# Patient Record
Sex: FEMALE | Race: WHITE | Employment: OTHER | URBAN - METROPOLITAN AREA
[De-identification: names, ages, dates, MRNs, and addresses within clinical notes are randomized per-mention and may not be internally consistent; named-entity substitution may affect disease eponyms.]

---

## 2017-05-31 ENCOUNTER — HOSPITAL ENCOUNTER (OUTPATIENT)
Dept: MAMMOGRAPHY | Age: 62
Discharge: HOME | End: 2017-05-31
Attending: FAMILY MEDICINE
Payer: COMMERCIAL

## 2017-05-31 DIAGNOSIS — Z12.31 VISIT FOR SCREENING MAMMOGRAM: ICD-10-CM

## 2017-05-31 PROCEDURE — 77063 BREAST TOMOSYNTHESIS BI: CPT

## 2018-06-20 ENCOUNTER — HOSPITAL ENCOUNTER (OUTPATIENT)
Dept: MAMMOGRAPHY | Age: 63
Discharge: HOME | End: 2018-06-20
Attending: FAMILY MEDICINE
Payer: COMMERCIAL

## 2018-06-20 DIAGNOSIS — Z12.39 SCREENING BREAST EXAMINATION: ICD-10-CM

## 2018-06-20 PROCEDURE — 77063 BREAST TOMOSYNTHESIS BI: CPT

## 2019-07-10 ENCOUNTER — HOSPITAL ENCOUNTER (OUTPATIENT)
Dept: MAMMOGRAPHY | Age: 64
Discharge: HOME | End: 2019-07-10
Attending: FAMILY MEDICINE
Payer: COMMERCIAL

## 2019-07-10 DIAGNOSIS — Z12.39 BREAST SCREENING, UNSPECIFIED: ICD-10-CM

## 2019-07-10 PROCEDURE — 77063 BREAST TOMOSYNTHESIS BI: CPT

## 2019-07-10 NOTE — PROGRESS NOTES
I got the result of her normal mammogram report. But advise pt she is long overdue a checkup. Patient not seen here since 2016 by Vish Ma, but I havent seen her since 2012. So she needs to get something scheduled soon.

## 2019-07-31 ENCOUNTER — OFFICE VISIT (OUTPATIENT)
Dept: FAMILY MEDICINE CLINIC | Age: 64
End: 2019-07-31

## 2019-07-31 VITALS
HEIGHT: 59 IN | SYSTOLIC BLOOD PRESSURE: 120 MMHG | DIASTOLIC BLOOD PRESSURE: 72 MMHG | WEIGHT: 145.6 LBS | RESPIRATION RATE: 18 BRPM | HEART RATE: 88 BPM | TEMPERATURE: 99 F | BODY MASS INDEX: 29.35 KG/M2 | OXYGEN SATURATION: 98 %

## 2019-07-31 DIAGNOSIS — Z00.00 ROUTINE GENERAL MEDICAL EXAMINATION AT A HEALTH CARE FACILITY: Primary | ICD-10-CM

## 2019-07-31 PROBLEM — Z12.83 SCREENING EXAM FOR SKIN CANCER: Status: ACTIVE | Noted: 2019-07-31

## 2019-07-31 PROBLEM — R13.10 DYSPHAGIA: Status: ACTIVE | Noted: 2019-07-31

## 2019-07-31 NOTE — PROGRESS NOTES
Jim Reza is a 59 y.o. female    Chief Complaint   Patient presents with    Complete Physical     1. Have you been to the ER, urgent care clinic since your last visit? Hospitalized since your last visit? No    2. Have you seen or consulted any other health care providers outside of the [address] [place] [address] since your last visit? Include any pap smears or colon screening.  No     Visit Vitals  /72 (BP 1 Location: Right arm, BP Patient Position: Sitting)   Pulse (!) 101   Temp 99 °F (37.2 °C) (Oral)   Resp 18   Ht 4' 11\" (1.499 m)   Wt 145 lb 9.6 oz (66 kg)   SpO2 98%   BMI 29.41 kg/m²

## 2019-07-31 NOTE — PROGRESS NOTES
Chief Complaint   Patient presents with    Complete Physical     not fasting today, will RTC later this week for labs     HISTORY OF PRESENT ILLNESS   HPI  Annual CPE. Not fasting today. Will get labs done later this week. Last CPE 2016. Has been getting along really well in the interim. Feels well. Sensible diet. Not exercising but stays active. Wt stable, unchanged from 2016. REVIEW OF SYMPTOMS   Review of Systems   Constitutional: Negative. HENT: Negative. Eyes: Negative. Respiratory: Negative. Cardiovascular: Negative. Gastrointestinal: Negative. Genitourinary: Negative. Musculoskeletal: Negative. Skin:        Saw Derm for skin check and cancer screening last year   Neurological: Negative. Psychiatric/Behavioral: Negative.             PROBLEM LIST/MEDICAL HISTORY     Problem List  Date Reviewed: 2019          Codes Class Noted    Dysphagia ICD-10-CM: R13.10  ICD-9-CM: 787.20  2019    Overview Signed 2019  1:44 PM by Lucie Ahuja MD     Eval  ENT Dr. Joyce Davis EGD Esophageal Dilatation              Retained suture ICD-10-CM: T81.89XA, Z18.9  ICD-9-CM: 998.89, V90.9  2012        Granulation tissue at vaginal vault ICD-10-CM: N89.8  ICD-9-CM: 623.8  2012        Seasonal allergic rhinitis, mild ICD-10-CM: J30.2  ICD-9-CM: 477.9  2010    Overview Signed 2010 11:34 AM by Lucie Ahuja MD     Spring/; OTC's have done fine prn over the yrs              (normal spontaneous vaginal delivery) x2 ICD-10-CM: O80  ICD-9-CM: 650  2010    Overview Signed 2010  9:04 PM by Lucie Ahuja MD     ; Previous Gyn Dr Theron Buerger             H/O Uterine Prolapse & Fibroids s/p Partial hysterectomy ICD-10-CM: N81.4  ICD-9-CM: 618.1  2010    Overview Signed 2010  9:05 PM by Lucie Ahuja MD                  History of eczema ICD-10-CM: Z87.2  ICD-9-CM: V13.3  2010 PAST SURGICAL HISTORY     Past Surgical History:   Procedure Laterality Date    APPENDECTOMY  1991    COLONOSCOPY  2009    ok x 10yrs    HX COLONOSCOPY  05/20/2017    Repeat 10 yrs, Dr. Jaimee Torrez HX ENDOSCOPY  05/20/2017    chronic gastritis and H Pylori positive-Dr Frankie Correia    HX PARTIAL HYSTERECTOMY  2002    Uterine Prolapse & Fibroids    HX TUBAL LIGATION  1986    HX WISDOM TEETH EXTRACTION           MEDICATIONS     Current Outpatient Medications   Medication Sig    BIOTIN PO Take  by mouth daily.  fluticasone (FLONASE) 50 mcg/actuation nasal spray 2 Sprays by Both Nostrils route daily.  MULTIVITAMINS (MULTIVITAMIN PO) Take  by mouth daily. No current facility-administered medications for this visit.            ALLERGIES   No Known Allergies       SOCIAL HISTORY     Social History     Socioeconomic History    Marital status:      Spouse name: Not on file    Number of children: 2    Years of education: Not on file    Highest education level: Not on file   Occupational History    Occupation: Dental Asst   Tobacco Use    Smoking status: Never Smoker    Smokeless tobacco: Never Used   Substance and Sexual Activity    Alcohol use: No    Drug use: No    Sexual activity: Yes     Partners: Male     Comment: Hysterectomy   Other Topics Concern    Caffeine Concern No     Comment: 1 cup of coffee a day    Weight Concern No     Comment: stable, ~ 130's    Special Diet Yes     Comment: just tries to eat sensibly, low fat, lots of veggies, high fiber    Exercise No     Comment: just stays very active        IMMUNIZATIONS  Immunization History   Administered Date(s) Administered    Influenza Vaccine (Quad) Mdck Pf 10/31/2018    Tdap 01/01/2016         FAMILY HISTORY     Family History   Problem Relation Age of Onset    High Cholesterol Mother     Arthritis-osteo Mother     Osteoporosis Mother     Stroke Mother         TIA    Dementia Mother 80        lives in [place]  Diabetes Brother 36        Type 2    Other Sister         fibroids, endometriosis    Deep Vein Thrombosis Sister         post-op DVT    Atrial Fibrillation Father 80    Attention Deficit Disorder Daughter     Parkinsonism Paternal Grandfather          in his [de-identified] Other Paternal Grandmother          at 80    No Known Problems Son          VITALS     Visit Vitals  /72 (BP 1 Location: Right arm, BP Patient Position: Sitting)   Pulse 88   Temp 99 °F (37.2 °C) (Oral)   Resp 18   Ht 4' 11\" (1.499 m)   Wt 145 lb 9.6 oz (66 kg)   SpO2 98%   BMI 29.41 kg/m²          PHYSICAL EXAMINATION   Physical Exam   Constitutional: She is oriented to person, place, and time and well-developed, well-nourished, and in no distress. HENT:   Right Ear: Tympanic membrane normal.   Left Ear: Tympanic membrane normal.   Nose: Nose normal.   Mouth/Throat: Oropharynx is clear and moist. No oropharyngeal exudate. Eyes: Pupils are equal, round, and reactive to light. Conjunctivae and EOM are normal.   Neck: Neck supple. No JVD present. Carotid bruit is not present. No thyromegaly present. Cardiovascular: Normal rate, regular rhythm and normal heart sounds. No murmur heard. Pulmonary/Chest: Effort normal and breath sounds normal. No respiratory distress. Abdominal: Soft. Bowel sounds are normal. She exhibits no distension and no mass. There is no tenderness. Musculoskeletal: Normal range of motion. She exhibits no edema or tenderness. Lymphadenopathy:     She has no cervical adenopathy. Neurological: She is alert and oriented to person, place, and time. Gait normal. Coordination normal.   Psychiatric: Mood and affect normal.   Vitals reviewed.           DIAGNOSTIC DATA         LABORATORY DATA     Results for orders placed or performed in visit on 16   HEPATITIS C AB   Result Value Ref Range    Hep C Virus Ab 0.1 0.0 - 0.9 s/co ratio   CBC W/O DIFF   Result Value Ref Range    WBC 5.5 3.4 - 10.8 x10E3/uL    RBC 4.37 3.77 - 5.28 x10E6/uL    HGB 12.9 11.1 - 15.9 g/dL    HCT 37.2 34.0 - 46.6 %    MCV 85 79 - 97 fL    MCH 29.5 26.6 - 33.0 pg    MCHC 34.7 31.5 - 35.7 g/dL    RDW 13.1 12.3 - 15.4 %    PLATELET 218 092 - 131 x10E3/uL   LIPID PANEL   Result Value Ref Range    Cholesterol, total 140 100 - 199 mg/dL    Triglyceride 83 0 - 149 mg/dL    HDL Cholesterol 47 >39 mg/dL    VLDL, calculated 17 5 - 40 mg/dL    LDL, calculated 76 0 - 99 mg/dL   METABOLIC PANEL, COMPREHENSIVE   Result Value Ref Range    Glucose 99 65 - 99 mg/dL    BUN 15 8 - 27 mg/dL    Creatinine 0.90 0.57 - 1.00 mg/dL    GFR est non-AA 70 >59 mL/min/1.73    GFR est AA 80 >59 mL/min/1.73    BUN/Creatinine ratio 17 11 - 26    Sodium 142 134 - 144 mmol/L    Potassium 4.7 3.5 - 5.2 mmol/L    Chloride 103 96 - 106 mmol/L    CO2 25 18 - 29 mmol/L    Calcium 9.7 8.7 - 10.3 mg/dL    Protein, total 6.6 6.0 - 8.5 g/dL    Albumin 4.4 3.6 - 4.8 g/dL    GLOBULIN, TOTAL 2.2 1.5 - 4.5 g/dL    A-G Ratio 2.0 1.1 - 2.5    Bilirubin, total 0.5 0.0 - 1.2 mg/dL    Alk. phosphatase 51 39 - 117 IU/L    AST (SGOT) 15 0 - 40 IU/L    ALT (SGPT) 18 0 - 32 IU/L   TSH 3RD GENERATION   Result Value Ref Range    TSH 0.957 0.450 - 4.500 uIU/mL   VITAMIN D, 25 HYDROXY   Result Value Ref Range    VITAMIN D, 25-HYDROXY 39.4 30.0 - 100.0 ng/mL   CVD REPORT   Result Value Ref Range    INTERPRETATION Note           ASSESSMENT & PLAN       ICD-10-CM ICD-9-CM    1. Routine general medical examination at a health care facility Z00.00 V70.0 CBC W/O DIFF      LIPID PANEL      METABOLIC PANEL, COMPREHENSIVE      TSH 3RD GENERATION      VITAMIN D, 25 HYDROXY            She will RTC fasting later in the week for the above labs  Cardiovascular risk and specific lipid/LDL goals reviewed  Reviewed diet, nutrition, exercise, weight management, BMI/goals, age/risk based screening recommendations, health maintenance & prevention counseling.  Cancer screening USPTFS guidelines reviewed w/ pt today. Discussed benefits/positive/negative outcomes of screening based on age/risk stratification. Informed consent for/against screening based on pt's personal hx/risk factors. Updated in history above and health maintenance. Further follow up & other recommendations pending review of labs.  If all remains good and stable, follow up in 1 yr, sooner prn

## 2019-07-31 NOTE — PATIENT INSTRUCTIONS
Well Visit, Women 48 to 72: Care Instructions  Your Care Instructions    Physical exams can help you stay healthy. Your doctor has checked your overall health and may have suggested ways to take good care of yourself. He or she also may have recommended tests. At home, you can help prevent illness with healthy eating, regular exercise, and other steps. Follow-up care is a key part of your treatment and safety. Be sure to make and go to all appointments, and call your doctor if you are having problems. It's also a good idea to know your test results and keep a list of the medicines you take. How can you care for yourself at home? · Reach and stay at a healthy weight. This will lower your risk for many problems, such as obesity, diabetes, heart disease, and high blood pressure. · Get at least 30 minutes of exercise on most days of the week. Walking is a good choice. You also may want to do other activities, such as running, swimming, cycling, or playing tennis or team sports. · Do not smoke. Smoking can make health problems worse. If you need help quitting, talk to your doctor about stop-smoking programs and medicines. These can increase your chances of quitting for good. · Protect your skin from too much sun. When you're outdoors from 10 a.m. to 4 p.m., stay in the shade or cover up with clothing and a hat with a wide brim. Wear sunglasses that block UV rays. Even when it's cloudy, put broad-spectrum sunscreen (SPF 30 or higher) on any exposed skin. · See a dentist one or two times a year for checkups and to have your teeth cleaned. · Wear a seat belt in the car. Follow your doctor's advice about when to have certain tests. These tests can spot problems early. · Cholesterol. Your doctor will tell you how often to have this done based on your age, family history, or other things that can increase your risk for heart attack and stroke. · Blood pressure.  Have your blood pressure checked during a routine doctor visit. Your doctor will tell you how often to check your blood pressure based on your age, your blood pressure results, and other factors. · Mammogram. Ask your doctor how often you should have a mammogram, which is an X-ray of your breasts. A mammogram can spot breast cancer before it can be felt and when it is easiest to treat. · Pap test and pelvic exam. Ask your doctor how often you should have a Pap test. You may not need to have a Pap test as often as you used to. · Vision. Have your eyes checked every year or two or as often as your doctor suggests. Some experts recommend that you have yearly exams for glaucoma and other age-related eye problems starting at age 48. · Hearing. Tell your doctor if you notice any change in your hearing. You can have tests to find out how well you hear. · Diabetes. Ask your doctor whether you should have tests for diabetes. · Colorectal cancer. Your risk for colorectal cancer gets higher as you get older. Some experts say that adults should start regular screening at age 48 and stop at age 76. Others say to start before age 48 or continue after age 76. Talk with your doctor about your risk and when to start and stop screening. · Thyroid disease. Talk to your doctor about whether to have your thyroid checked as part of a regular physical exam. Women have an increased chance of a thyroid problem. · Osteoporosis. You should begin tests for bone density at age 72. If you are younger than 72, ask your doctor whether you have factors that may increase your risk for this disease. You may want to have this test before age 72. · Heart attack and stroke risk. At least every 4 to 6 years, you should have your risk for heart attack and stroke assessed. Your doctor uses factors such as your age, blood pressure, cholesterol, and whether you smoke or have diabetes to show what your risk for a heart attack or stroke is over the next 10 years.   When should you call for help?  Watch closely for changes in your health, and be sure to contact your doctor if you have any problems or symptoms that concern you. Where can you learn more? Go to http://compa-sam.info/. Enter E888 in the search box to learn more about \"Well Visit, Women 50 to 72: Care Instructions. \"  Current as of: December 13, 2018  Content Version: 12.1  © 3081-5692 [employer], [employer]. Care instructions adapted under license by [employer] (which disclaims liability or warranty for this information). If you have questions about a medical condition or this instruction, always ask your healthcare professional. [address] any warranty or liability for your use of this information.

## 2019-08-03 LAB
25(OH)D3+25(OH)D2 SERPL-MCNC: 38.3 NG/ML (ref 30–100)
ALBUMIN SERPL-MCNC: 4.2 G/DL (ref 3.6–4.8)
ALBUMIN/GLOB SERPL: 1.6 {RATIO} (ref 1.2–2.2)
ALP SERPL-CCNC: 52 IU/L (ref 39–117)
ALT SERPL-CCNC: 18 IU/L (ref 0–32)
AST SERPL-CCNC: 16 IU/L (ref 0–40)
BILIRUB SERPL-MCNC: 1 MG/DL (ref 0–1.2)
BUN SERPL-MCNC: 10 MG/DL (ref 8–27)
BUN/CREAT SERPL: 10 (ref 12–28)
CALCIUM SERPL-MCNC: 9.5 MG/DL (ref 8.7–10.3)
CHLORIDE SERPL-SCNC: 102 MMOL/L (ref 96–106)
CHOLEST SERPL-MCNC: 138 MG/DL (ref 100–199)
CO2 SERPL-SCNC: 22 MMOL/L (ref 20–29)
CREAT SERPL-MCNC: 1.04 MG/DL (ref 0.57–1)
ERYTHROCYTE [DISTWIDTH] IN BLOOD BY AUTOMATED COUNT: 12.4 % (ref 12.3–15.4)
GLOBULIN SER CALC-MCNC: 2.6 G/DL (ref 1.5–4.5)
GLUCOSE SERPL-MCNC: 131 MG/DL (ref 65–99)
HCT VFR BLD AUTO: 38.6 % (ref 34–46.6)
HDLC SERPL-MCNC: 42 MG/DL
HGB BLD-MCNC: 12.7 G/DL (ref 11.1–15.9)
INTERPRETATION, 910389: NORMAL
INTERPRETATION: NORMAL
LDLC SERPL CALC-MCNC: 74 MG/DL (ref 0–99)
MCH RBC QN AUTO: 29.5 PG (ref 26.6–33)
MCHC RBC AUTO-ENTMCNC: 32.9 G/DL (ref 31.5–35.7)
MCV RBC AUTO: 90 FL (ref 79–97)
PDF IMAGE, 910387: NORMAL
PLATELET # BLD AUTO: 287 X10E3/UL (ref 150–450)
POTASSIUM SERPL-SCNC: 4.4 MMOL/L (ref 3.5–5.2)
PROT SERPL-MCNC: 6.8 G/DL (ref 6–8.5)
RBC # BLD AUTO: 4.3 X10E6/UL (ref 3.77–5.28)
SODIUM SERPL-SCNC: 141 MMOL/L (ref 134–144)
TRIGL SERPL-MCNC: 109 MG/DL (ref 0–149)
TSH SERPL DL<=0.005 MIU/L-ACNC: 1.03 UIU/ML (ref 0.45–4.5)
VLDLC SERPL CALC-MCNC: 22 MG/DL (ref 5–40)
WBC # BLD AUTO: 8.2 X10E3/UL (ref 3.4–10.8)

## 2019-08-18 ENCOUNTER — TELEPHONE (OUTPATIENT)
Dept: FAMILY MEDICINE CLINIC | Age: 64
End: 2019-08-18

## 2019-08-18 DIAGNOSIS — N28.9 MILD RENAL INSUFFICIENCY: ICD-10-CM

## 2019-08-18 DIAGNOSIS — R73.01 IMPAIRED FASTING GLUCOSE: Primary | ICD-10-CM

## 2019-08-18 NOTE — TELEPHONE ENCOUNTER
Blood counts, liver, electrolytes, thyroid and vitamin d all normal.    Cholesterol numbers overall good and at goal except HDL goal for women >50, hers is 42. BS is elevated at 131, normal <100, between 100-126 prediabetes, over 126 diabetes but we dont base that on just one test that is mildly elevated like this. Will recheck labs, see details below. Follow sugar free, low carb diet and try to get at least 150 minutes moderate cardio exercise per week. Kidney fnc borderline. Increase water intake and avoid nsaids. I would like her to come back for lab only, no appt needed and do NOT fast. Orders pended. I will be rechecking her cr and adding a HgBA1c 3 month sugar average to see what her range has been. Drink lots of water prior to lab testing that day.   Further recs after labs

## 2019-08-21 NOTE — TELEPHONE ENCOUNTER
Outgoing call to patient,  verified. Reviewed lab results and recommendations, patient expressed understanding, states she will return for labs.

## 2019-08-21 NOTE — TELEPHONE ENCOUNTER
Patient is returning a call to the office and requested to speak to dr. Nataliia Butcher nurse.      Best contact# [phone]

## 2019-09-09 ENCOUNTER — TELEPHONE (OUTPATIENT)
Dept: FAMILY MEDICINE CLINIC | Age: 64
End: 2019-09-09

## 2019-09-09 NOTE — TELEPHONE ENCOUNTER
LVM that she had Hep C done back in Dec 2016 and it was neg.   She can call me back if any questions

## 2019-09-09 NOTE — TELEPHONE ENCOUNTER
Pt is calling to see if she has a Hep C done earlier and what was the results  [facility]# [phone]    Last hep C : 12/16/2016

## 2019-09-13 DIAGNOSIS — N28.9 MILD RENAL INSUFFICIENCY: ICD-10-CM

## 2019-09-13 DIAGNOSIS — R73.01 IMPAIRED FASTING GLUCOSE: ICD-10-CM

## 2019-09-14 LAB
BUN SERPL-MCNC: 11 MG/DL (ref 8–27)
BUN/CREAT SERPL: 11 (ref 12–28)
CALCIUM SERPL-MCNC: 9.7 MG/DL (ref 8.7–10.3)
CHLORIDE SERPL-SCNC: 103 MMOL/L (ref 96–106)
CO2 SERPL-SCNC: 24 MMOL/L (ref 20–29)
CREAT SERPL-MCNC: 0.99 MG/DL (ref 0.57–1)
EST. AVERAGE GLUCOSE BLD GHB EST-MCNC: 137 MG/DL
GLUCOSE SERPL-MCNC: 104 MG/DL (ref 65–99)
HBA1C MFR BLD: 6.4 % (ref 4.8–5.6)
POTASSIUM SERPL-SCNC: 5.1 MMOL/L (ref 3.5–5.2)
SODIUM SERPL-SCNC: 143 MMOL/L (ref 134–144)

## 2019-09-15 ENCOUNTER — TELEPHONE (OUTPATIENT)
Dept: FAMILY MEDICINE CLINIC | Age: 64
End: 2019-09-15

## 2019-09-15 PROBLEM — R73.03 PREDIABETES: Status: ACTIVE | Noted: 2019-09-15

## 2019-09-15 NOTE — TELEPHONE ENCOUNTER
Advise pt her follow up creatinine has improved and is back to normal.  Continue staying adequately hydrated and avoiding nsaids. Her Hgba1c is elevated in the prediabetes range. I did a separate letter just to include some recommendations/tips for us to mail to patient, let her know. Schedule non fasting follow up appt for 6 months to reassess this.

## 2019-09-16 NOTE — TELEPHONE ENCOUNTER
Outgoing call to patient,  verified. Reviewed results patient expressed understanding. Address verified. Patient states she will call back to schedule follow up when she knows what days she has off for work.

## 2020-08-26 ENCOUNTER — HOSPITAL ENCOUNTER (OUTPATIENT)
Dept: MAMMOGRAPHY | Age: 65
Discharge: HOME | End: 2020-08-26
Attending: FAMILY MEDICINE
Payer: COMMERCIAL

## 2020-08-26 DIAGNOSIS — Z12.31 VISIT FOR SCREENING MAMMOGRAM: ICD-10-CM

## 2020-08-26 PROCEDURE — 77063 BREAST TOMOSYNTHESIS BI: CPT

## 2021-08-02 ENCOUNTER — TRANSCRIBE ORDER (OUTPATIENT)
Dept: SCHEDULING | Age: 66
End: 2021-08-02

## 2021-08-02 DIAGNOSIS — Z12.31 VISIT FOR SCREENING MAMMOGRAM: Primary | ICD-10-CM

## 2021-09-01 ENCOUNTER — HOSPITAL ENCOUNTER (OUTPATIENT)
Dept: MAMMOGRAPHY | Age: 66
Discharge: HOME | End: 2021-09-01
Attending: FAMILY MEDICINE
Payer: COMMERCIAL

## 2021-09-01 DIAGNOSIS — Z12.31 VISIT FOR SCREENING MAMMOGRAM: ICD-10-CM

## 2021-09-01 PROCEDURE — 77063 BREAST TOMOSYNTHESIS BI: CPT

## 2022-03-19 PROBLEM — R13.10 DYSPHAGIA: Status: ACTIVE | Noted: 2019-07-31

## 2022-03-19 PROBLEM — R73.01 IMPAIRED FASTING GLUCOSE: Status: ACTIVE | Noted: 2019-08-18

## 2022-03-19 PROBLEM — Z12.83 SCREENING EXAM FOR SKIN CANCER: Status: ACTIVE | Noted: 2019-07-31

## 2022-03-19 PROBLEM — R73.03 PREDIABETES: Status: ACTIVE | Noted: 2019-09-15

## 2022-08-16 ENCOUNTER — TRANSCRIBE ORDER (OUTPATIENT)
Dept: SCHEDULING | Age: 67
End: 2022-08-16

## 2022-08-16 DIAGNOSIS — Z12.31 SCREENING MAMMOGRAM FOR HIGH-RISK PATIENT: Primary | ICD-10-CM

## 2022-09-22 ENCOUNTER — HOSPITAL ENCOUNTER (OUTPATIENT)
Dept: MAMMOGRAPHY | Age: 67
Discharge: HOME | End: 2022-09-22
Attending: FAMILY MEDICINE
Payer: MEDICARE

## 2022-09-22 DIAGNOSIS — Z12.31 SCREENING MAMMOGRAM FOR HIGH-RISK PATIENT: ICD-10-CM

## 2022-09-22 PROCEDURE — 77063 BREAST TOMOSYNTHESIS BI: CPT

## 2022-11-15 ENCOUNTER — OFFICE VISIT (OUTPATIENT)
Dept: FAMILY MEDICINE CLINIC | Age: 67
End: 2022-11-15
Payer: MEDICARE

## 2022-11-15 VITALS
HEART RATE: 82 BPM | RESPIRATION RATE: 16 BRPM | SYSTOLIC BLOOD PRESSURE: 136 MMHG | HEIGHT: 59 IN | TEMPERATURE: 98.8 F | DIASTOLIC BLOOD PRESSURE: 70 MMHG | WEIGHT: 143.2 LBS | BODY MASS INDEX: 28.87 KG/M2 | OXYGEN SATURATION: 100 %

## 2022-11-15 DIAGNOSIS — Z13.820 SCREENING FOR OSTEOPOROSIS: ICD-10-CM

## 2022-11-15 DIAGNOSIS — Z13.6 SCREENING FOR ISCHEMIC HEART DISEASE: ICD-10-CM

## 2022-11-15 DIAGNOSIS — Z00.00 INITIAL MEDICARE ANNUAL WELLNESS VISIT: Primary | ICD-10-CM

## 2022-11-15 DIAGNOSIS — R73.03 PREDIABETES: ICD-10-CM

## 2022-11-15 DIAGNOSIS — Z78.0 POSTMENOPAUSAL STATE: ICD-10-CM

## 2022-11-15 PROCEDURE — G0438 PPPS, INITIAL VISIT: HCPCS | Performed by: FAMILY MEDICINE

## 2022-11-15 PROCEDURE — 1123F ACP DISCUSS/DSCN MKR DOCD: CPT | Performed by: FAMILY MEDICINE

## 2022-11-15 PROCEDURE — G8400 PT W/DXA NO RESULTS DOC: HCPCS | Performed by: FAMILY MEDICINE

## 2022-11-15 PROCEDURE — G8427 DOCREV CUR MEDS BY ELIG CLIN: HCPCS | Performed by: FAMILY MEDICINE

## 2022-11-15 PROCEDURE — G8417 CALC BMI ABV UP PARAM F/U: HCPCS | Performed by: FAMILY MEDICINE

## 2022-11-15 PROCEDURE — G8510 SCR DEP NEG, NO PLAN REQD: HCPCS | Performed by: FAMILY MEDICINE

## 2022-11-15 PROCEDURE — 1101F PT FALLS ASSESS-DOCD LE1/YR: CPT | Performed by: FAMILY MEDICINE

## 2022-11-15 PROCEDURE — 3017F COLORECTAL CA SCREEN DOC REV: CPT | Performed by: FAMILY MEDICINE

## 2022-11-15 PROCEDURE — G8536 NO DOC ELDER MAL SCRN: HCPCS | Performed by: FAMILY MEDICINE

## 2022-11-15 PROCEDURE — G9899 SCRN MAM PERF RSLTS DOC: HCPCS | Performed by: FAMILY MEDICINE

## 2022-11-15 NOTE — PROGRESS NOTES
Chief Complaint   Patient presents with    Annual Wellness Visit       HISTORY OF PRESENT ILLNESS   HPI  Follow up prediabetes and mild hypercholesterolemia. Not fasting today. Diet: nothing special, just follows sensible, balanced diet, lots of fruits/veggies, grilling/baking, mostly chicken/fish/seafood, occ red meat  Exercise: walks 1 mile every day w/ , they also stay very active in their yard and haul a lot of firewood  Caffeine: 1 cup of coffee qam, no tea or sodas  Weight: stable in the 140's over the years   REVIEW OF SYMPTOMS   Review of Systems   Constitutional: Negative. HENT: Negative. Eyes: Negative. Respiratory: Negative. Cardiovascular: Negative. Gastrointestinal: Negative. Genitourinary: Negative. Musculoskeletal: Negative. Neurological: Negative. Endo/Heme/Allergies: Negative. Psychiatric/Behavioral: Negative.            PROBLEM LIST/MEDICAL HISTORY     Problem List  Date Reviewed: 11/15/2022            Codes Class Noted    Prediabetes ICD-10-CM: R73.03  ICD-9-CM: 790.29  9/15/2019        Impaired fasting glucose ICD-10-CM: R73.01  ICD-9-CM: 790.21  8/18/2019    Overview Signed 8/18/2019  3:53 PM by Biju Solares MD     [id number]             Dysphagia ICD-10-CM: R13.10  ICD-9-CM: 787.20  7/31/2019    Overview Signed 7/31/2019  1:44 PM by Biju Solares MD     Eval 2017 ENT Dr. Carol Castro EGD Esophageal Dilatation              Screening exam for skin cancer ICD-10-CM: Z12.83  ICD-9-CM: V76.43  7/31/2019    Overview Signed 7/31/2019  2:01 PM by Biju Solares MD     2018 Derm Dr. Valentino Serum             Retained suture ICD-10-CM: T81.89XA, Z18.9  ICD-9-CM: 998.89, V90.9  11/30/2012        Granulation tissue at vaginal vault ICD-10-CM: N89.8  ICD-9-CM: 623.8  11/30/2012        Seasonal allergic rhinitis, mild ICD-10-CM: J30.2  ICD-9-CM: 477.9  9/22/2010    Overview Signed 9/22/2010 11:34 AM by Biju Solares MD Spring/fall; OTC's have done fine prn over the yrs              (normal spontaneous vaginal delivery) x2 ICD-10-CM: O80  ICD-9-CM: 650  2010    Overview Signed 2010  9:04 PM by Sudhir Salinas MD     ; Previous Gyn Dr Miguel Song             H/O Uterine Prolapse & Fibroids s/p Partial hysterectomy ICD-10-CM: N81.4  ICD-9-CM: 618.1  2010    Overview Signed 2010  9:05 PM by Sudhir Salinas MD     2002             History of eczema ICD-10-CM: Z87.2  ICD-9-CM: V13.3  2010               PAST SURGICAL HISTORY     Past Surgical History:   Procedure Laterality Date    HX COLONOSCOPY      Ok x 10yrs    HX COLONOSCOPY  2017    Repeat 10 yrs, Dr. Nkechi Conley ENDOSCOPY  2017    Chronic gastritis and H Pylori positive-Dr Nkechi Conley PARTIAL HYSTERECTOMY      Uterine Prolapse & Fibroids    HX TUBAL LIGATION      HX WISDOM TEETH EXTRACTION      UT APPENDECTOMY           MEDICATIONS     Current Outpatient Medications   Medication Sig    fluticasone (FLONASE) 50 mcg/actuation nasal spray 2 Sprays by Both Nostrils route daily. MULTIVITAMINS (MULTIVITAMIN PO) Take  by mouth daily. No current facility-administered medications for this visit.           ALLERGIES   No Known Allergies       SOCIAL HISTORY     Social History     Tobacco Use    Smoking status: Never    Smokeless tobacco: Never   Substance Use Topics    Alcohol use: No     Comment: none     Social History     Social History Narrative    , 2 children    Retired Dental Assistant    Lives in [facility] w/     Son lives in [facility] with 3 yo grandson and 10 yo granddaughter    Daughter lives in [facility], 2 yo granddaughter        Diet: nothing special, just follows sensible, balanced diet, lots of fruits/veggies, grilling/baking, mostly chicken/fish/seafood, occ red meat    Exercise: walks 1 mile every day w/ , they also stay very active in their yard and haul a lot of firewood    Caffeine: 1 cup of coffee qam, no tea or sodas    Weight: stable in the 140's over the years     Social History     Substance and Sexual Activity   Sexual Activity Yes    Partners: Male    Comment: Hysterectomy       IMMUNIZATIONS     Immunization History   Administered Date(s) Administered    COVID-19, MODERNA BLUE border, Primary or Immunocompromised, (age 18y+), IM, 100 mcg/0.5mL 2021, 2021, 11/10/2021, 2022    Influenza High Dose Vaccine PF 2022    Influenza Vaccine 10/23/2019    Influenza, FLUCELVAX, (age 10 mo+), MDCK, PF 10/31/2018, 10/23/2019    Tdap 2016, 2019         FAMILY HISTORY     Family History   Problem Relation Age of Onset    High Cholesterol Mother     OSTEOARTHRITIS Mother     Osteoporosis Mother     Stroke Mother         TIA    Dementia Mother 80        lives in [place]    Diabetes Brother 36        Type 2    Other Sister         fibroids, endometriosis    Deep Vein Thrombosis Sister         post-op DVT    Atrial Fibrillation Father 80    Attention Deficit Disorder Daughter     Parkinsonism Paternal Grandfather          in his [de-identified]    Other Paternal Grandmother          at 80    No Known Problems Son          VITALS   Visit Vitals  /70 (BP 1 Location: Left upper arm, BP Patient Position: Sitting, BP Cuff Size: Adult)   Pulse 82   Temp 98.8 °F (37.1 °C) (Oral)   Resp 16   Ht 4' 11\" (1.499 m)   Wt 143 lb 3.2 oz (65 kg)   SpO2 100%   BMI 28.92 kg/m²          PHYSICAL EXAMINATION   Physical Exam  Vitals reviewed. Constitutional:       Appearance: Normal appearance. HENT:      Right Ear: Tympanic membrane normal.      Left Ear: Tympanic membrane normal.   Eyes:      Conjunctiva/sclera: Conjunctivae normal.   Neck:      Thyroid: No thyroid mass, thyromegaly or thyroid tenderness. Vascular: No carotid bruit. Cardiovascular:      Rate and Rhythm: Normal rate and regular rhythm. Heart sounds: Normal heart sounds. Pulmonary:      Effort: Pulmonary effort is normal.      Breath sounds: Normal breath sounds. Abdominal:      General: There is no distension. Palpations: Abdomen is soft. There is no mass. Tenderness: There is no abdominal tenderness. Musculoskeletal:         General: No swelling or tenderness. Normal range of motion. Cervical back: Neck supple. Right lower leg: No edema. Left lower leg: No edema. Lymphadenopathy:      Cervical: No cervical adenopathy. Neurological:      General: No focal deficit present. Mental Status: She is alert and oriented to person, place, and time. Mental status is at baseline. Psychiatric:         Mood and Affect: Mood and affect normal.         Cognition and Memory: Cognition and memory normal.              LABORATORY DATA/ANCILLARY/IMAGING     Results for orders placed or performed in visit on 09/13/19   HEMOGLOBIN A1C WITH EAG   Result Value Ref Range    Hemoglobin A1c 6.4 (H) 4.8 - 5.6 %    Estimated average glucose 677 mg/dL   METABOLIC PANEL, BASIC   Result Value Ref Range    Glucose 104 (H) 65 - 99 mg/dL    BUN 11 8 - 27 mg/dL    Creatinine 0.99 0.57 - 1.00 mg/dL    GFR est non-AA 61 >59 mL/min/1.73    GFR est AA 71 >59 mL/min/1.73    BUN/Creatinine ratio 11 (L) 12 - 28    Sodium 143 134 - 144 mmol/L    Potassium 5.1 3.5 - 5.2 mmol/L    Chloride 103 96 - 106 mmol/L    CO2 24 20 - 29 mmol/L    Calcium 9.7 8.7 - 10.3 mg/dL             ASSESSMENT & PLAN   Diagnoses and all orders for this visit:    1. Initial Medicare annual wellness visit  See separate note under this same encounter visit for Medicare Wellness note. Age/risk based screening recommendations, health maintenance & prevention counseling. Cancer screening USPTFS guidelines reviewed w/ pt today. Discussed benefits/positive/negative outcomes of screening based on age/risk stratification. Informed consent for/against screening based on pt's personal hx/risk factors.    Updated in history above and health maintenance. New recommendations: Dexa and Pneumovax    2. Screening for osteoporosis  -     DEXA BONE DENSITY STUDY AXIAL; Future    3. Postmenopausal state  -     DEXA BONE DENSITY STUDY AXIAL; Future    4. Screening for ischemic heart disease  -     LIPID PANEL; Future    5. Prediabetes  -     CBC W/O DIFF; Future  -     METABOLIC PANEL, COMPREHENSIVE; Future  -     TSH 3RD GENERATION; Future  -     HEMOGLOBIN A1C WITH EAG; Future    Order given for fasting labs to be done at [place] walk in lab draw station near her home in the next week  Specific lipid/LDL/HgBA1c goals assessed  Reviewed diet, nutrition, exercise, weight management, BMI/goals. Further follow up & other recommendations pending review of labs.  If all remains good and stable, follow up in 1 yr

## 2022-11-15 NOTE — PROGRESS NOTES
This is an Initial Medicare Annual Wellness Exam (AWV) (Performed 12 months after IPPE or effective date of Medicare Part B enrollment, Once in a lifetime)    I have reviewed the patient's medical history in detail and updated the computerized patient record. Assessment/Plan   Education and counseling provided:  Are appropriate based on today's review and evaluation  Cardiovascular screening blood test  Bone mass measurement (DEXA)    1. Initial Medicare annual wellness visit  2. Screening for osteoporosis  -     DEXA BONE DENSITY STUDY AXIAL; Future  3. Postmenopausal state  -     DEXA BONE DENSITY STUDY AXIAL; Future  4. Screening for ischemic heart disease  -     LIPID PANEL; Future         Depression Risk Factor Screening     3 most recent PHQ Screens 11/15/2022   Little interest or pleasure in doing things Not at all   Feeling down, depressed, irritable, or hopeless Not at all   Total Score PHQ 2 0       Alcohol & Drug Abuse Risk Screen    Do you average more than 1 drink per night or more than 7 drinks a week:  No    On any one occasion in the past three months have you have had more than 3 drinks containing alcohol:  No          Functional Ability and Level of Safety    Hearing: Hearing is good.      Activities of Daily Living:    Patient does total self care  ADL Assessment 11/15/2022   Feeding yourself No Help Needed   Getting from bed to chair No Help Needed   Getting dressed No Help Needed   Bathing or showering No Help Needed   Walk across the room (includes cane/walker) No Help Needed   Using the telphone No Help Needed   Taking your medications No Help Needed   Preparing meals No Help Needed   Managing money (expenses/bills) No Help Needed   Moderately strenuous housework (laundry) No Help Needed   Shopping for personal items (toiletries/medicines) No Help Needed   Shopping for groceries No Help Needed   Driving No Help Needed   Climbing a flight of stairs No Help Needed   Getting to places beyond walking distances No Help Needed        Ambulation: with no difficulty      Fall Risk:  Fall Risk Assessment, last 12 mths 11/15/2022   Able to walk? Yes   Fall in past 12 months? 0   Do you feel unsteady?  0   Are you worried about falling 0      Abuse Screen:  Patient is not abused       Cognitive Screening    Has your family/caregiver stated any concerns about your memory: no         Health Maintenance Due     Health Maintenance Due   Topic Date Due    Depression Screen  Never done    A1C test (Diabetic or Prediabetic)  2020    Bone Densitometry (Dexa) Screening  Never done       Patient Care Team   Patient Care Team:  Elenita Tran MD as PCP - General  Elenita Tran MD as PCP - [address] [place] [address] Dr Garces Provider    History     Patient Active Problem List   Diagnosis Code    History of eczema Z87.2     (normal spontaneous vaginal delivery) x2 O80    H/O Uterine Prolapse & Fibroids s/p Partial hysterectomy N81.4    Seasonal allergic rhinitis, mild J30.2    Retained suture T81.89XA, Z18.9    Granulation tissue at vaginal vault N89.8    Dysphagia R13.10    Screening exam for skin cancer Z12.83    Impaired fasting glucose R73.01    Prediabetes R73.03     Past Medical History:   Diagnosis Date    Dysphagia 2019    Eval 2017 ENT Dr. Ra Santiago, GI Dr. Natalie Martínez EGD Esophageal Dilatation     H/O Uterine Prolapse & Fibroids s/p Partial hysterectomy 2010    History of eczema 2010    Impaired fasting glucose 2019-    Menopause     LMP-2002     (normal spontaneous vaginal delivery) x2 2010    Prediabetes 9/15/2019    Screening exam for skin cancer 2019    2018 Derm Dr. Anabell Mcpherson    Seasonal allergic rhinitis, mild 2010      Past Surgical History:   Procedure Laterality Date    HX COLONOSCOPY      Ok x 10yrs    HX COLONOSCOPY  2017    Repeat 10 yrs, Dr. Verline Nageotte ENDOSCOPY  2017    Chronic gastritis and H Pylori positive-Dr Verline Nageotte PARTIAL HYSTERECTOMY  2002    Uterine Prolapse & Fibroids    HX TUBAL LIGATION  1986    HX WISDOM TEETH EXTRACTION      MO APPENDECTOMY       Current Outpatient Medications   Medication Sig Dispense Refill    fluticasone (FLONASE) 50 mcg/actuation nasal spray 2 Sprays by Both Nostrils route daily. 1 Bottle 11    MULTIVITAMINS (MULTIVITAMIN PO) Take  by mouth daily.        No Known Allergies    Family History   Problem Relation Age of Onset    High Cholesterol Mother     OSTEOARTHRITIS Mother     Osteoporosis Mother     Stroke Mother         TIA    Dementia Mother 80        lives in [place]    Diabetes Brother 36        Type 2    Other Sister         fibroids, endometriosis    Deep Vein Thrombosis Sister         post-op DVT    Atrial Fibrillation Father 80    Attention Deficit Disorder Daughter     Parkinsonism Paternal Grandfather          in his [de-identified]    Other Paternal Grandmother          at 80    No Known Problems Son      Social History     Tobacco Use    Smoking status: Never    Smokeless tobacco: Never   Substance Use Topics    Alcohol use: No     Comment: none       Delora Litten, MD

## 2022-11-15 NOTE — PATIENT INSTRUCTIONS
Medicare Wellness Visit, Female     The best way to live healthy is to have a lifestyle where you eat a well-balanced diet, exercise regularly, limit alcohol use, and quit all forms of tobacco/nicotine, if applicable. Regular preventive services are another way to keep healthy. Preventive services (vaccines, screening tests, monitoring & exams) can help personalize your care plan, which helps you manage your own care. Screening tests can find health problems at the earliest stages, when they are easiest to treat. [facility] follows the current, evidence-based guidelines published by the [facility] ([facility]) when recommending preventive services for our patients. Because we follow these guidelines, sometimes recommendations change over time as research supports it. (For example, mammograms used to be recommended annually. Even though Medicare will still pay for an annual mammogram, the newer guidelines recommend a mammogram every two years for women of average risk). Of course, you and your doctor may decide to screen more often for some diseases, based on your risk and your co-morbidities (chronic disease you are already diagnosed with). Preventive services for you include:  - Medicare offers their members a free annual wellness visit, which is time for you and your primary care provider to discuss and plan for your preventive service needs. Take advantage of this benefit every year!  -All adults over the age of 72 should receive the recommended pneumonia vaccines. Current USPSTF guidelines recommend a series of two vaccines for the best pneumonia protection.   -All adults should have a flu vaccine yearly and a tetanus vaccine every 10 years.   -All adults age 48 and older should receive the shingles vaccines (series of two vaccines).       -All adults age 38-68 who are overweight should have a diabetes screening test once every three years.   -All adults born between 80 and 1965 should be screened once for Hepatitis C.  -Other screening tests and preventive services for persons with diabetes include: an eye exam to screen for diabetic retinopathy, a kidney function test, a foot exam, and stricter control over your cholesterol.   -Cardiovascular screening for adults with routine risk involves an electrocardiogram (ECG) at intervals determined by your doctor.   -Colorectal cancer screenings should be done for adults age 54-65 with no increased risk factors for colorectal cancer. There are a number of acceptable methods of screening for this type of cancer. Each test has its own benefits and drawbacks. Discuss with your doctor what is most appropriate for you during your annual wellness visit. The different tests include: colonoscopy (considered the best screening method), a fecal occult blood test, a fecal DNA test, and sigmoidoscopy.    -A bone mass density test is recommended when a woman turns 65 to screen for osteoporosis. This test is only recommended one time, as a screening. Some providers will use this same test as a disease monitoring tool if you already have osteoporosis. -Breast cancer screenings are recommended every other year for women of normal risk, age 54-69.  -Cervical cancer screenings for women over age 72 are only recommended with certain risk factors. Here is a list of your current Health Maintenance items (your personalized list of preventive services) with a due date:  Health Maintenance Due   Topic Date Due    Depresssion Screening  Never done    Hemoglobin A1C    09/13/2020    Bone Mineral Density   Never done         Medicare Wellness Visit, Female     The best way to live healthy is to have a lifestyle where you eat a well-balanced diet, exercise regularly, limit alcohol use, and quit all forms of tobacco/nicotine, if applicable. Regular preventive services are another way to keep healthy.  Preventive services (vaccines, screening tests, monitoring & exams) can help personalize your care plan, which helps you manage your own care. Screening tests can find health problems at the earliest stages, when they are easiest to treat. [facility] follows the current, evidence-based guidelines published by the [facility] ([facility]) when recommending preventive services for our patients. Because we follow these guidelines, sometimes recommendations change over time as research supports it. (For example, mammograms used to be recommended annually. Even though Medicare will still pay for an annual mammogram, the newer guidelines recommend a mammogram every two years for women of average risk). Of course, you and your doctor may decide to screen more often for some diseases, based on your risk and your co-morbidities (chronic disease you are already diagnosed with). Preventive services for you include:  - Medicare offers their members a free annual wellness visit, which is time for you and your primary care provider to discuss and plan for your preventive service needs. Take advantage of this benefit every year!  -All adults over the age of 72 should receive the recommended pneumonia vaccines. Current USPSTF guidelines recommend a series of two vaccines for the best pneumonia protection.   -All adults should have a flu vaccine yearly and a tetanus vaccine every 10 years.   -All adults age 48 and older should receive the shingles vaccines (series of two vaccines).       -All adults age 38-68 who are overweight should have a diabetes screening test once every three years.   -All adults born between [address] and 1965 should be screened once for Hepatitis C.  -Other screening tests and preventive services for persons with diabetes include: an eye exam to screen for diabetic retinopathy, a kidney function test, a foot exam, and stricter control over your cholesterol.   -Cardiovascular screening for adults with routine risk involves an electrocardiogram (ECG) at intervals determined by your doctor.   -Colorectal cancer screenings should be done for adults age 54-65 with no increased risk factors for colorectal cancer. There are a number of acceptable methods of screening for this type of cancer. Each test has its own benefits and drawbacks. Discuss with your doctor what is most appropriate for you during your annual wellness visit. The different tests include: colonoscopy (considered the best screening method), a fecal occult blood test, a fecal DNA test, and sigmoidoscopy.    -A bone mass density test is recommended when a woman turns 65 to screen for osteoporosis. This test is only recommended one time, as a screening. Some providers will use this same test as a disease monitoring tool if you already have osteoporosis. -Breast cancer screenings are recommended every other year for women of normal risk, age 54-69.  -Cervical cancer screenings for women over age 72 are only recommended with certain risk factors.      Here is a list of your current Health Maintenance items (your personalized list of preventive services) with a due date:  Health Maintenance Due   Topic Date Due    Depresssion Screening  Never done    Hemoglobin A1C    09/13/2020    Bone Mineral Density   Never done

## 2022-11-15 NOTE — PROGRESS NOTES
Chief Complaint   Patient presents with    Annual Wellness Visit     1. \"Have you been to the ER, urgent care clinic since your last visit? Hospitalized since your last visit? \" No    2. \"Have you seen or consulted any other health care providers outside of the [address] [place] [address] since your last visit? \"  Ortho @ [facility]    3. For patients aged 39-70: Has the patient had a colonoscopy / FIT/ Cologuard? Yes - no Care Gap present 5/2017      If the patient is female:    4. For patients aged 41-77: Has the patient had a mammogram within the past 2 years? Yes - no Care Gap present 9/2022      5. For patients aged 21-65: Has the patient had a pap smear?  NA - based on age or sex

## 2022-12-01 ENCOUNTER — HOSPITAL ENCOUNTER (OUTPATIENT)
Dept: MAMMOGRAPHY | Age: 67
Discharge: HOME | End: 2022-12-01
Attending: FAMILY MEDICINE
Payer: MEDICARE

## 2022-12-01 DIAGNOSIS — Z78.0 POSTMENOPAUSAL STATE: ICD-10-CM

## 2022-12-01 DIAGNOSIS — Z13.820 SCREENING FOR OSTEOPOROSIS: ICD-10-CM

## 2022-12-01 PROCEDURE — 77080 DXA BONE DENSITY AXIAL: CPT

## 2023-09-06 ENCOUNTER — TRANSCRIBE ORDERS (OUTPATIENT)
Facility: HOSPITAL | Age: 68
End: 2023-09-06

## 2023-09-06 DIAGNOSIS — Z12.31 ENCOUNTER FOR SCREENING MAMMOGRAM FOR BREAST CANCER: Primary | ICD-10-CM

## 2023-10-09 ENCOUNTER — HOSPITAL ENCOUNTER (OUTPATIENT)
Facility: HOSPITAL | Age: 68
Discharge: HOME | End: 2023-10-12
Attending: FAMILY MEDICINE
Payer: MEDICARE

## 2023-10-09 DIAGNOSIS — Z12.31 ENCOUNTER FOR SCREENING MAMMOGRAM FOR BREAST CANCER: ICD-10-CM

## 2023-10-09 PROCEDURE — 77063 BREAST TOMOSYNTHESIS BI: CPT

## 2024-01-08 SDOH — HEALTH STABILITY: PHYSICAL HEALTH: ON AVERAGE, HOW MANY DAYS PER WEEK DO YOU ENGAGE IN MODERATE TO STRENUOUS EXERCISE (LIKE A BRISK WALK)?: 2 DAYS

## 2024-01-08 SDOH — ECONOMIC STABILITY: TRANSPORTATION INSECURITY
IN THE PAST 12 MONTHS, HAS LACK OF TRANSPORTATION KEPT YOU FROM MEETINGS, WORK, OR FROM GETTING THINGS NEEDED FOR DAILY LIVING?: NO

## 2024-01-08 SDOH — ECONOMIC STABILITY: INCOME INSECURITY: HOW HARD IS IT FOR YOU TO PAY FOR THE VERY BASICS LIKE FOOD, HOUSING, MEDICAL CARE, AND HEATING?: NOT HARD AT ALL

## 2024-01-08 SDOH — HEALTH STABILITY: PHYSICAL HEALTH: ON AVERAGE, HOW MANY MINUTES DO YOU ENGAGE IN EXERCISE AT THIS LEVEL?: 40 MIN

## 2024-01-08 SDOH — ECONOMIC STABILITY: FOOD INSECURITY: WITHIN THE PAST 12 MONTHS, YOU WORRIED THAT YOUR FOOD WOULD RUN OUT BEFORE YOU GOT MONEY TO BUY MORE.: NEVER TRUE

## 2024-01-08 SDOH — ECONOMIC STABILITY: HOUSING INSECURITY
IN THE LAST 12 MONTHS, WAS THERE A TIME WHEN YOU DID NOT HAVE A STEADY PLACE TO SLEEP OR SLEPT IN A SHELTER (INCLUDING NOW)?: NO

## 2024-01-08 SDOH — ECONOMIC STABILITY: FOOD INSECURITY: WITHIN THE PAST 12 MONTHS, THE FOOD YOU BOUGHT JUST DIDN'T LAST AND YOU DIDN'T HAVE MONEY TO GET MORE.: NEVER TRUE

## 2024-01-08 ASSESSMENT — PATIENT HEALTH QUESTIONNAIRE - PHQ9
SUM OF ALL RESPONSES TO PHQ QUESTIONS 1-9: 0
1. LITTLE INTEREST OR PLEASURE IN DOING THINGS: 0
2. FEELING DOWN, DEPRESSED OR HOPELESS: 0
SUM OF ALL RESPONSES TO PHQ9 QUESTIONS 1 & 2: 0
SUM OF ALL RESPONSES TO PHQ QUESTIONS 1-9: 0

## 2024-01-08 ASSESSMENT — LIFESTYLE VARIABLES
HOW OFTEN DO YOU HAVE A DRINK CONTAINING ALCOHOL: NEVER
HOW MANY STANDARD DRINKS CONTAINING ALCOHOL DO YOU HAVE ON A TYPICAL DAY: PATIENT DOES NOT DRINK
HOW MANY STANDARD DRINKS CONTAINING ALCOHOL DO YOU HAVE ON A TYPICAL DAY: 0
HOW OFTEN DO YOU HAVE SIX OR MORE DRINKS ON ONE OCCASION: 1
HOW OFTEN DO YOU HAVE A DRINK CONTAINING ALCOHOL: 1

## 2024-01-11 ENCOUNTER — OFFICE VISIT (OUTPATIENT)
Age: 69
End: 2024-01-11
Payer: MEDICARE

## 2024-01-11 VITALS
SYSTOLIC BLOOD PRESSURE: 118 MMHG | OXYGEN SATURATION: 98 % | WEIGHT: 134.4 LBS | RESPIRATION RATE: 16 BRPM | BODY MASS INDEX: 28.21 KG/M2 | DIASTOLIC BLOOD PRESSURE: 66 MMHG | HEIGHT: 58 IN | HEART RATE: 90 BPM | TEMPERATURE: 99 F

## 2024-01-11 DIAGNOSIS — R73.03 PREDIABETES: ICD-10-CM

## 2024-01-11 DIAGNOSIS — Z23 NEED FOR 23-POLYVALENT PNEUMOCOCCAL POLYSACCHARIDE VACCINE: ICD-10-CM

## 2024-01-11 DIAGNOSIS — Z00.00 INITIAL MEDICARE ANNUAL WELLNESS VISIT: Primary | ICD-10-CM

## 2024-01-11 DIAGNOSIS — Z23 ENCOUNTER FOR IMMUNIZATION: ICD-10-CM

## 2024-01-11 PROCEDURE — 99213 OFFICE O/P EST LOW 20 MIN: CPT | Performed by: FAMILY MEDICINE

## 2024-01-11 PROCEDURE — 1036F TOBACCO NON-USER: CPT | Performed by: FAMILY MEDICINE

## 2024-01-11 PROCEDURE — G8419 CALC BMI OUT NRM PARAM NOF/U: HCPCS | Performed by: FAMILY MEDICINE

## 2024-01-11 PROCEDURE — G8484 FLU IMMUNIZE NO ADMIN: HCPCS | Performed by: FAMILY MEDICINE

## 2024-01-11 PROCEDURE — 1123F ACP DISCUSS/DSCN MKR DOCD: CPT | Performed by: FAMILY MEDICINE

## 2024-01-11 PROCEDURE — PBSHW PR ADMIN PNEUMOCOCCAL VACCINE: Performed by: FAMILY MEDICINE

## 2024-01-11 PROCEDURE — G8427 DOCREV CUR MEDS BY ELIG CLIN: HCPCS | Performed by: FAMILY MEDICINE

## 2024-01-11 PROCEDURE — 90732 PPSV23 VACC 2 YRS+ SUBQ/IM: CPT | Performed by: FAMILY MEDICINE

## 2024-01-11 PROCEDURE — PBSHW PNEUMOCOCCAL, PPSV23, PNEUMOVAX 23, (AGE 2 YRS+), SC/IM: Performed by: FAMILY MEDICINE

## 2024-01-11 PROCEDURE — 3017F COLORECTAL CA SCREEN DOC REV: CPT | Performed by: FAMILY MEDICINE

## 2024-01-11 PROCEDURE — 1090F PRES/ABSN URINE INCON ASSESS: CPT | Performed by: FAMILY MEDICINE

## 2024-01-11 PROCEDURE — G0438 PPPS, INITIAL VISIT: HCPCS | Performed by: FAMILY MEDICINE

## 2024-01-11 PROCEDURE — G8399 PT W/DXA RESULTS DOCUMENT: HCPCS | Performed by: FAMILY MEDICINE

## 2024-01-11 NOTE — PROGRESS NOTES
Chief Complaint   Patient presents with    Medicare AWV         Prediabetes     Not fasting     1. \"Have you been to the ER, urgent care clinic since your last visit?  Hospitalized since your last visit?\" No    2. \"Have you seen or consulted any other health care providers outside of the [facility] since your last visit?\" Eye exam     3. For patients aged 45-75: Has the patient had a colonoscopy / FIT/ Cologuard? Yes - no Care Gap present 5/2017 Repeat 10 yrs, Dr. Sauceda       If the patient is female:    4. For patients aged 40-74: Has the patient had a mammogram within the past 2 years? Yes - no Care Gap present 10/2023       5. For patients aged 21-65: Has the patient had a pap smear? NA - based on age or sex      Medicare Annual Wellness Visit    Darya Lizama is here for Medicare AWV () and Prediabetes (Not fasting)    Assessment & Plan   Initial Medicare annual wellness visit  Recommendations for Preventive Services Due: see orders and patient instructions/AVS.  Recommended screening schedule for the next 5-10 years is provided to the patient in written form: see Patient Instructions/AVS.  Prediabetes  -     CBC; Future  -     Comprehensive Metabolic Panel; Future  -     Hemoglobin A1C; Future  BMI 27.0-27.9,adult  -     Comprehensive Metabolic Panel; Future  -     Hemoglobin A1C; Future  -     Lipid Panel; Future  -     TSH; Future  Encounter for immunization  -     Pneumococcal, PPSV23, PNEUMOVAX 23, (age 2 yrs+), SC/IM  -     [place] ADMIN PNEUMOCOCCAL VACCINE  Need for 23-polyvalent pneumococcal polysaccharide vaccine  -     Pneumococcal, PPSV23, PNEUMOVAX 23, (age 2 yrs+), SC/IM  -     [place] ADMIN PNEUMOCOCCAL VACCINE    Order given for the above labs to be done fasting at our walk in lab draw station near her home in the AM.   Reviewed diet, nutrition, exercise, weight management/goals, risk reduction, cardiovascular goals for age and associated health risks, medications, effects,

## 2024-01-17 DIAGNOSIS — R73.03 PREDIABETES: ICD-10-CM

## 2024-01-17 LAB
ALBUMIN SERPL-MCNC: 3.9 G/DL (ref 3.5–5)
ALBUMIN/GLOB SERPL: 1.3 (ref 1.1–2.2)
ALP SERPL-CCNC: 65 U/L (ref 45–117)
ALT SERPL-CCNC: 16 U/L (ref 12–78)
ANION GAP SERPL CALC-SCNC: 3 MMOL/L (ref 5–15)
AST SERPL-CCNC: 12 U/L (ref 15–37)
BILIRUB SERPL-MCNC: 0.6 MG/DL (ref 0.2–1)
BUN SERPL-MCNC: 17 MG/DL (ref 6–20)
BUN/CREAT SERPL: 20 (ref 12–20)
CALCIUM SERPL-MCNC: 9.1 MG/DL (ref 8.5–10.1)
CHLORIDE SERPL-SCNC: 108 MMOL/L (ref 97–108)
CHOLEST SERPL-MCNC: 148 MG/DL
CO2 SERPL-SCNC: 30 MMOL/L (ref 21–32)
CREAT SERPL-MCNC: 0.83 MG/DL (ref 0.55–1.02)
ERYTHROCYTE [DISTWIDTH] IN BLOOD BY AUTOMATED COUNT: 12.2 % (ref 11.5–14.5)
EST. AVERAGE GLUCOSE BLD GHB EST-MCNC: 126 MG/DL
GLOBULIN SER CALC-MCNC: 2.9 G/DL (ref 2–4)
GLUCOSE SERPL-MCNC: 124 MG/DL (ref 65–100)
HBA1C MFR BLD: 6 % (ref 4–5.6)
HCT VFR BLD AUTO: 39.4 % (ref 35–47)
HDLC SERPL-MCNC: 50 MG/DL
HDLC SERPL: 3 (ref 0–5)
HGB BLD-MCNC: 12.9 G/DL (ref 11.5–16)
LDLC SERPL CALC-MCNC: 78.4 MG/DL (ref 0–100)
MCH RBC QN AUTO: 28.9 PG (ref 26–34)
MCHC RBC AUTO-ENTMCNC: 32.7 G/DL (ref 30–36.5)
MCV RBC AUTO: 88.3 FL (ref 80–99)
NRBC # BLD: 0 K/UL (ref 0–0.01)
NRBC BLD-RTO: 0 PER 100 WBC
PLATELET # BLD AUTO: 277 K/UL (ref 150–400)
PMV BLD AUTO: 10.4 FL (ref 8.9–12.9)
POTASSIUM SERPL-SCNC: 4.4 MMOL/L (ref 3.5–5.1)
PROT SERPL-MCNC: 6.8 G/DL (ref 6.4–8.2)
RBC # BLD AUTO: 4.46 M/UL (ref 3.8–5.2)
SODIUM SERPL-SCNC: 141 MMOL/L (ref 136–145)
TRIGL SERPL-MCNC: 98 MG/DL
TSH SERPL DL<=0.05 MIU/L-ACNC: 1.58 UIU/ML (ref 0.36–3.74)
VLDLC SERPL CALC-MCNC: 19.6 MG/DL
WBC # BLD AUTO: 5.5 K/UL (ref 3.6–11)

## 2024-02-14 ENCOUNTER — OFFICE VISIT (OUTPATIENT)
Age: 69
End: 2024-02-14
Payer: MEDICARE

## 2024-02-14 VITALS
WEIGHT: 135.6 LBS | OXYGEN SATURATION: 98 % | DIASTOLIC BLOOD PRESSURE: 70 MMHG | TEMPERATURE: 98.7 F | SYSTOLIC BLOOD PRESSURE: 132 MMHG | HEIGHT: 58 IN | RESPIRATION RATE: 16 BRPM | HEART RATE: 74 BPM | BODY MASS INDEX: 28.46 KG/M2

## 2024-02-14 DIAGNOSIS — H26.9 CATARACT OF BOTH EYES, UNSPECIFIED CATARACT TYPE: ICD-10-CM

## 2024-02-14 DIAGNOSIS — Z01.818 PREOP EXAMINATION: Primary | ICD-10-CM

## 2024-02-14 DIAGNOSIS — R73.03 PREDIABETES: ICD-10-CM

## 2024-02-14 PROCEDURE — 1036F TOBACCO NON-USER: CPT | Performed by: FAMILY MEDICINE

## 2024-02-14 PROCEDURE — G8484 FLU IMMUNIZE NO ADMIN: HCPCS | Performed by: FAMILY MEDICINE

## 2024-02-14 PROCEDURE — G8427 DOCREV CUR MEDS BY ELIG CLIN: HCPCS | Performed by: FAMILY MEDICINE

## 2024-02-14 PROCEDURE — 99214 OFFICE O/P EST MOD 30 MIN: CPT | Performed by: FAMILY MEDICINE

## 2024-02-14 PROCEDURE — 1090F PRES/ABSN URINE INCON ASSESS: CPT | Performed by: FAMILY MEDICINE

## 2024-02-14 PROCEDURE — G8419 CALC BMI OUT NRM PARAM NOF/U: HCPCS | Performed by: FAMILY MEDICINE

## 2024-02-14 PROCEDURE — 3017F COLORECTAL CA SCREEN DOC REV: CPT | Performed by: FAMILY MEDICINE

## 2024-02-14 PROCEDURE — 1123F ACP DISCUSS/DSCN MKR DOCD: CPT | Performed by: FAMILY MEDICINE

## 2024-02-14 PROCEDURE — G8399 PT W/DXA RESULTS DOCUMENT: HCPCS | Performed by: FAMILY MEDICINE

## 2024-02-14 NOTE — PROGRESS NOTES
Chief Complaint   Patient presents with    Pre-op Exam     Cataract Surgery 2/27/24 & 3/7/24, Dr. Harpreet Yepez, Replaced by [facility]     1. \"Have you been to the ER, urgent care clinic since your last visit?  Hospitalized since your last visit?\" No    2. \"Have you seen or consulted any other health care providers outside of the [facility] since your last visit?\" No     3. For patients aged 45-75: Has the patient had a colonoscopy / FIT/ Cologuard? Yes - no Care Gap present 5/2017 Repeat 10 yrs, Dr. Sauceda       If the patient is female:    4. For patients aged 40-74: Has the patient had a mammogram within the past 2 years? Yes - no Care Gap present 10/2023       5. For patients aged 21-65: Has the patient had a pap smear? NA - based on age or sex      
Repeat 10 yrs, Dr. Sauceda    COLONOSCOPY  2009    Ok x 10yrs    PARTIAL HYSTERECTOMY (CERVIX NOT REMOVED)  2002    Uterine Prolapse & Fibroids    HI APPENDECTOMY  1991    TUBAL LIGATION  1986    UPPER GASTROINTESTINAL ENDOSCOPY  05/20/2017    Chronic gastritis and H Pylori positive-Dr Sauceda    WISDOM TOOTH EXTRACTION         MEDICATIONS     Current Outpatient Medications   Medication Sig    Calcium Carb-Cholecalciferol (CALCIUM 600+D3 PO) Take by mouth 2 times daily Daily Total: Calcium 1200 mg, Vitamin D3 1,000 units    Multiple Vitamin (MULTIVITAMIN ADULT PO) Take 1 tablet by mouth daily    fluticasone (FLONASE) 50 MCG/ACT nasal spray 2 sprays by Nasal route daily as needed (spring allergies)     No current facility-administered medications for this visit.          ALLERGIES   No Known Allergies       SOCIAL HISTORY     Social History     Tobacco Use    Smoking status: Never    Smokeless tobacco: Never   Substance Use Topics    Alcohol use: No     IMMUNIZATIONS     Immunization History   Administered Date(s) Administered    COVID-19, MODERNA BLUE border, Primary or Immunocompromised, (age 12y+), IM, 100 mcg/0.5mL 01/22/2021, 02/17/2021, 11/10/2021, 11/01/2022    COVID-19, MODERNA Bivalent, (age 12y+), IM, 50 mcg/0.5 mL 11/01/2022    COVID-19, MODERNA, (2023-24 formula), (age 12y+), IM, 50mcg/0.5mL 10/15/2023    Influenza Virus Vaccine 10/23/2019, 10/15/2023    Influenza, FLUCELVAX, (age 6 mo+), MDCK, PF, 0.5mL 10/31/2018, 10/23/2019    Influenza, High Dose (Fluzone 65 yrs and older) 11/01/2022    Pneumococcal, PPSV23, PNEUMOVAX 23, (age 2y+), SC/IM, 0.5mL 01/11/2024    TDaP, ADACEL (age 10y-64y), BOOSTRIX (age 10y+), IM, 0.5mL 01/01/2016, 02/26/2019     FAMILY HISTORY     Family History   Problem Relation Age of Onset    High Cholesterol Mother     Osteoarthritis Mother     Osteoporosis Mother     Stroke Mother         TIA    Dementia Mother 83        lives in [place]    Atrial Fibrillation Father 81

## 2024-09-16 ENCOUNTER — TRANSCRIBE ORDERS (OUTPATIENT)
Facility: HOSPITAL | Age: 69
End: 2024-09-16

## 2024-09-16 DIAGNOSIS — Z12.31 SCREENING MAMMOGRAM FOR HIGH-RISK PATIENT: Primary | ICD-10-CM

## 2024-10-29 ENCOUNTER — HOSPITAL ENCOUNTER (OUTPATIENT)
Facility: HOSPITAL | Age: 69
Discharge: HOME | End: 2024-11-01
Attending: FAMILY MEDICINE
Payer: MEDICARE

## 2024-10-29 DIAGNOSIS — Z12.31 ENCOUNTER FOR SCREENING MAMMOGRAM FOR HIGH-RISK PATIENT: ICD-10-CM

## 2024-10-29 PROCEDURE — 77063 BREAST TOMOSYNTHESIS BI: CPT

## 2024-12-13 ENCOUNTER — HOSPITAL ENCOUNTER (OUTPATIENT)
Facility: HOSPITAL | Age: 69
Discharge: HOME | End: 2024-12-16
Attending: FAMILY MEDICINE
Payer: MEDICARE

## 2024-12-13 VITALS — BODY MASS INDEX: 27.21 KG/M2 | WEIGHT: 135 LBS | HEIGHT: 59 IN

## 2024-12-13 DIAGNOSIS — R92.8 ABNORMAL MAMMOGRAM: ICD-10-CM

## 2024-12-13 PROCEDURE — 77065 DX MAMMO INCL CAD UNI: CPT

## 2024-12-13 PROCEDURE — 76642 ULTRASOUND BREAST LIMITED: CPT

## 2024-12-31 ENCOUNTER — HOSPITAL ENCOUNTER (OUTPATIENT)
Facility: HOSPITAL | Age: 69
Discharge: HOME | End: 2025-01-03
Attending: FAMILY MEDICINE
Payer: MEDICARE

## 2024-12-31 DIAGNOSIS — R92.8 ABNORMAL MAMMOGRAM: ICD-10-CM

## 2024-12-31 PROCEDURE — 88342 IMHCHEM/IMCYTCHM 1ST ANTB: CPT

## 2024-12-31 PROCEDURE — 88360 TUMOR IMMUNOHISTOCHEM/MANUAL: CPT

## 2024-12-31 PROCEDURE — 88305 TISSUE EXAM BY PATHOLOGIST: CPT

## 2024-12-31 PROCEDURE — G0279 TOMOSYNTHESIS, MAMMO: HCPCS

## 2024-12-31 PROCEDURE — 6360000002 HC RX W HCPCS: Performed by: STUDENT IN AN ORGANIZED HEALTH CARE EDUCATION/TRAINING PROGRAM

## 2024-12-31 PROCEDURE — 77065 DX MAMMO INCL CAD UNI: CPT

## 2024-12-31 RX ORDER — LIDOCAINE HYDROCHLORIDE 10 MG/ML
15 INJECTION, SOLUTION INFILTRATION; PERINEURAL ONCE
Status: COMPLETED | OUTPATIENT
Start: 2024-12-31 | End: 2024-12-31

## 2024-12-31 RX ORDER — LIDOCAINE HYDROCHLORIDE AND EPINEPHRINE 10; 10 MG/ML; UG/ML
10 INJECTION, SOLUTION INFILTRATION; PERINEURAL ONCE
Status: COMPLETED | OUTPATIENT
Start: 2024-12-31 | End: 2024-12-31

## 2024-12-31 RX ADMIN — LIDOCAINE HYDROCHLORIDE,EPINEPHRINE BITARTRATE 10 ML: 10; .01 INJECTION, SOLUTION INFILTRATION; PERINEURAL at 08:35

## 2024-12-31 RX ADMIN — LIDOCAINE HYDROCHLORIDE 15 ML: 10 INJECTION, SOLUTION INFILTRATION; PERINEURAL at 08:30

## 2024-12-31 NOTE — PROGRESS NOTES
Patient tolerated right breast biopsy well with mcdonald bleeding. Biopsy site was bandaged in the usual fashion and discharge instructions were reviewed with the patient. She was provided with a written copy as well. Advised patient that results should be available in 2-3 business days and that she will receive a phone call. Encouraged her to call with any questions or concerns.

## 2025-01-08 ENCOUNTER — TELEPHONE (OUTPATIENT)
Facility: HOSPITAL | Age: 70
End: 2025-01-08

## 2025-01-08 DIAGNOSIS — C50.911 MALIGNANT NEOPLASM OF RIGHT BREAST IN FEMALE, ESTROGEN RECEPTOR POSITIVE, UNSPECIFIED SITE OF BREAST (HCC): Primary | ICD-10-CM

## 2025-01-08 DIAGNOSIS — Z17.0 MALIGNANT NEOPLASM OF RIGHT BREAST IN FEMALE, ESTROGEN RECEPTOR POSITIVE, UNSPECIFIED SITE OF BREAST (HCC): Primary | ICD-10-CM

## 2025-01-08 NOTE — TELEPHONE ENCOUNTER
[facility]  Breast Navigator Encounter    Name:    Darya Lizama  Age:    69 y.o.  Diagnosis:   RIGHT breast cancer    Attempted to reach out to patient prior to her appointment week after next.        She was not available, so I left a message asking her to call me back at her convenience.       I also mentioned on the message that I had plenty of slot next week for MRIs.                                 Lyn Morgan RN, BSN, CBCN  Oncology Breast Navigator     [address]  [address]  W: [phone]  F: [phone]  [email or web address]  Good Help to Those in Need®

## 2025-01-10 NOTE — TELEPHONE ENCOUNTER
[facility]  Breast Navigator Encounter    Name:  Darya Lizama      Age:  69 y.o.  Diagnosis:    RIGHT breast cancer      Interdisciplinary Team:  Med-Onc:                          Surg-Onc:          Dr. Burgos       Rad-Onc:         Plastics:           :     Nurse Navigator:  Lyn Morgan, RN, BSN, CBCN    Encounter type:  []Patient Initiated  []Navigator Follow-up []Pre-op  []Post-op  []Check-in Prior to First Treatment []Treatment Modality Change  [x]Initial Navigator Encounter []Other:       Narrative:    Patient called back for initial navigator contact.  I explained what happens at the first consultation - an exam by the physician, a possible ultrasound, then complete discussion of surgical options and other treatment that may be considered.  I encouraged the patient to bring  someone with her to this appointment. She will probably bring her  and daughter.        Discussed that a breast MRI has been ordered by Dr. Burgos, and is the next step in her work-up.  I explained the MRI procedure to her.  I confirmed that she is not claustrophobic, does not have breast implants and does not have any metal in her body.  I explained that she can eat and drink normally and can drive herself to and from the appointment.  I told her that she would be asked to remove most metal items.   I was able to get her scheduled for Monday, January 13, 2025 at [employer] at 11:15 am.      Talked about stage 1 breast cancer which is ER/TX+ and HER-2 negative.      Talked about surgery, radiation and AI.  Also discussed the possibility of chemotherapy.  Discussed restrictions after surgery and what the surgery will be like.  She does not have any family who have had breast cancer, so this is all new for her.      I discussed the possible order of treatment.      I provided the patient with my name and phone number.  Discussed the role of navigation.  I will meet the patient when she

## 2025-01-13 ENCOUNTER — HOSPITAL ENCOUNTER (OUTPATIENT)
Age: 70
Discharge: HOME | End: 2025-01-16
Payer: MEDICARE

## 2025-01-13 DIAGNOSIS — Z17.0 MALIGNANT NEOPLASM OF RIGHT BREAST IN FEMALE, ESTROGEN RECEPTOR POSITIVE, UNSPECIFIED SITE OF BREAST (HCC): ICD-10-CM

## 2025-01-13 DIAGNOSIS — C50.911 MALIGNANT NEOPLASM OF RIGHT BREAST IN FEMALE, ESTROGEN RECEPTOR POSITIVE, UNSPECIFIED SITE OF BREAST (HCC): ICD-10-CM

## 2025-01-13 PROCEDURE — C8908 MRI W/O FOL W/CONT, BREAST,: HCPCS

## 2025-01-13 PROCEDURE — 6360000004 HC RX CONTRAST MEDICATION: Performed by: RADIOLOGY

## 2025-01-13 PROCEDURE — A9585 GADOBUTROL INJECTION: HCPCS | Performed by: RADIOLOGY

## 2025-01-13 RX ORDER — GADOBUTROL 604.72 MG/ML
6 INJECTION INTRAVENOUS
Status: COMPLETED | OUTPATIENT
Start: 2025-01-13 | End: 2025-01-13

## 2025-01-13 RX ADMIN — GADOBUTROL 6 ML: 604.72 INJECTION INTRAVENOUS at 11:51

## 2025-01-21 ENCOUNTER — TELEPHONE (OUTPATIENT)
Age: 70
End: 2025-01-21

## 2025-01-22 ENCOUNTER — CLINICAL DOCUMENTATION (OUTPATIENT)
Age: 70
End: 2025-01-22

## 2025-01-22 ENCOUNTER — CLINICAL DOCUMENTATION (OUTPATIENT)
Facility: HOSPITAL | Age: 70
End: 2025-01-22

## 2025-01-22 ENCOUNTER — OFFICE VISIT (OUTPATIENT)
Age: 70
End: 2025-01-22
Payer: MEDICARE

## 2025-01-22 ENCOUNTER — TELEPHONE (OUTPATIENT)
Age: 70
End: 2025-01-22

## 2025-01-22 VITALS — WEIGHT: 135 LBS | HEIGHT: 59 IN | BODY MASS INDEX: 27.21 KG/M2

## 2025-01-22 DIAGNOSIS — C50.911 INVASIVE DUCTAL CARCINOMA OF BREAST, FEMALE, RIGHT (HCC): Primary | ICD-10-CM

## 2025-01-22 PROCEDURE — G8419 CALC BMI OUT NRM PARAM NOF/U: HCPCS | Performed by: SURGERY

## 2025-01-22 PROCEDURE — 93702 BIS XTRACELL FLUID ANALYSIS: CPT | Performed by: SURGERY

## 2025-01-22 PROCEDURE — 1090F PRES/ABSN URINE INCON ASSESS: CPT | Performed by: SURGERY

## 2025-01-22 PROCEDURE — 76642 ULTRASOUND BREAST LIMITED: CPT | Performed by: SURGERY

## 2025-01-22 PROCEDURE — G8399 PT W/DXA RESULTS DOCUMENT: HCPCS | Performed by: SURGERY

## 2025-01-22 PROCEDURE — 1159F MED LIST DOCD IN RCRD: CPT | Performed by: SURGERY

## 2025-01-22 PROCEDURE — 3017F COLORECTAL CA SCREEN DOC REV: CPT | Performed by: SURGERY

## 2025-01-22 PROCEDURE — G8427 DOCREV CUR MEDS BY ELIG CLIN: HCPCS | Performed by: SURGERY

## 2025-01-22 PROCEDURE — 1036F TOBACCO NON-USER: CPT | Performed by: SURGERY

## 2025-01-22 PROCEDURE — 99205 OFFICE O/P NEW HI 60 MIN: CPT | Performed by: SURGERY

## 2025-01-22 PROCEDURE — 1123F ACP DISCUSS/DSCN MKR DOCD: CPT | Performed by: SURGERY

## 2025-01-22 PROCEDURE — 1160F RVW MEDS BY RX/DR IN RCRD: CPT | Performed by: SURGERY

## 2025-01-22 NOTE — PROGRESS NOTES
[facility]  Breast Navigator Encounter    Name:  Darya Lizama      Age:  69 y.o.  Diagnosis:    RIGHT breast cancer  Date of Visit:  January 22, 2025      Interdisciplinary Team:  Med-Onc:                          Surg-Onc:          Dr. Pruitt      Rad-Onc:         Plastics:           :     Nurse Navigator:  Lyn Morgan RN, BSN, CBCN    Encounter type:  []Patient Initiated  [x]Navigator Follow-up []Pre-op  []Post-op  []Check-in Prior to First Treatment []Treatment Modality Change  []Initial Navigator Encounter []Other:       Narrative:    Met patient and  at the time of her initial appointment with Dr. Pruitt.  She had a whole list of questions, and she felt like she got answers to all of her questions.     Briefly talked about surgery and recovery.  Also discussed radiation post-lumpectomy.  Briefly mentioned the PIERRE trial for radiation.      Provided support.  Discussed that the role of the navigator is to coordinate timely care, make sure the plan of treatment is understood, and all questions are answered.   Provided patient with my business card.       I will continue to follow the patient.      Referrals/Handouts:   Business card, breast cancer pin.            Lyn Morgan RN, BSN, CBCN  Oncology Breast Navigator     [address]  [address]  W: [phone]  F: [phone]  [email or web address]  Good Help to Those in Need®

## 2025-01-22 NOTE — PROGRESS NOTES
NCCN Distress Thermometer    [facility]    Date Screening Completed: 1/22/25    Screening Declined:  [] Yes    Number that best describes how much distress you've experienced in the past week, including today?  0 [x] - No distress 1 []      2 []      3 []      4 []       5 []       6 []      7 []      8 []      9 []       10 [] - Extreme distress    PROBLEM LIST  Have you had concerns about any of the items below in the past week, including today?      Physical Concerns Practical Concerns   [] Pain [] Taking care of myself    [] Sleep [] Taking care of others    [] Fatigue [] Safety   [] Tobacco use  [] Work   [] Substance use  [] School   [] Memory or concentration [] Housing/Utilities   [] Sexual health [] Finances   [] Changes in eating  [] Insurance   [] Loss or change of physical abilities  [] Transportation    []    Emotional Concerns [] Having enough food   [] Worry or anxiety [] Access to medicine   [] Sadness or depression [] Treatment decisions   [] Loss of interest or enjoyment     [] Grief or loss  Spiritual or Mandaeism Concerns   [] Fear [] Sense of meaning or purpose   [] Loneliness  [] Changes in marine or beliefs   [] Anger [] Death, dying, or afterlife   [] Changes in appearance [] Conflict between beliefs and cancer treatments    [] Feelings of worthlessness or being a burden [] Relationship with the sacred    [] Ritual or dietary needs    Social Concerns     [] Relationship with spouse or partner     [] Relationship with children    [] Relationship with family members     [] Relationship with friends or coworkers     [] Communication with health care team     [] Ability to have children     [] Prejudice or discrimination        Other Concerns:

## 2025-01-22 NOTE — PROGRESS NOTES
HISTORY OF PRESENT ILLNESS  Darya Lizama is a 69 y.o. female     HPI NEW patient consult referred by [facility] for RIGHT breast cancer. Patient's annual screening is what led to diagnosis. Patient denies any breast issues or concerns. Patient is accompanied by her  today.     12/31/24: RIGHT breast biopsy: IMC with lobular features, favor histologic grade II (of III), spanning 11 mm in greatest dimension. ER+ (%)/NC+(%)/HER2-, Ki-67 20%.     Family History: NO genetic testing  Paternal Grandmother, breast, unknown age diagnosis    MRI Result (most recent):  MRI BREAST BILATERAL W WO CONTRAST 01/13/2025    Narrative  MRI BREAST BILATERAL W WO CONTRAST, 1/13/2025 12:57 PM  INDICATION: Malignant neoplasm of unspecified site of right female breast / New  diagnosis of RIGHT breast cancer  HISTORY: Newly diagnosed invasive mammary carcinoma with lobular features in the  right breast  COMPARISON: Mammograms and ultrasound from December, 2024  .  TECHNIQUE:  Bilateral breast MRI was performed using a dedicated breast coil without  compression with the patient in the prone position. Precontrast T1-weighted  images with fat suppression were obtained followed by bolus injection of  contrast. Postcontrast dynamic and high-resolution images were acquired.  T2-weighted axial imaging with fat suppression was also performed. The images  were analyzed using software for kinetic analysis, enhancement curves, digital  subtraction, and reconstructions.  .  RIGHT BREAST:  Background parenchymal enhancement: Mild  There is a small area of irregular, masslike enhancement in the upper, inner  quadrant with an associated biopsy clip measuring 1 x 0.8 x 0.7 cm.    There are no suspicious internal mammary or axillary chain lymph nodes.  .  LEFT BREAST:  Background parenchymal enhancement: Mild  There is no suspicious masslike or nonmasslike enhancement within the left  breast to indicate breast carcinoma.    There are no 
  Medication Sig Dispense Refill    Calcium Carb-Cholecalciferol (CALCIUM 600+D3 PO) Take by mouth 2 times daily Daily Total: Calcium 1200 mg, Vitamin D3 1,000 units      Multiple Vitamin (MULTIVITAMIN ADULT PO) Take 1 tablet by mouth daily      fluticasone (FLONASE) 50 MCG/ACT nasal spray 2 sprays by Nasal route daily as needed (spring allergies)       No current facility-administered medications on file prior to visit.       No Known Allergies    OB History          2    Para   2    Term   2            AB        Living             SAB        IAB        Ectopic        Molar        Multiple        Live Births   2                SOZO® Documentation for Visits L-Dex® Analysis for Lymphedema        2025   L-Dex Measurement Amb   Measurement Type Unilateral   Body Element Arm   Limb Dominance Right   Physical Assessment No Abnormal   Purpose for Testing Baseline   Baseline L-Dex Score - Right -1.3        The patient had a baseline SOZO measurement which I reviewed today. The score is Within normal limits, see scanned to EMR. Bioimpedance spectroscopy helps identify the onset of lymphedema in an arm or leg before patients experience noticeable swelling. Research has shown that 92% of patients with early detection of lymphedema using L-Dex combined with intervention do not progress to chronic lymphedema through three years. Additionally, as of 2023, the NCCN Guidelines® for Survivorship recommend proactive screening for lymphedema using bioimpedance spectroscopy. Whenever possible, patients are tested for baseline L-Dex score before cancer treatment begins and then are reassessed during regular follow-up visits using the SOZO device. Otherwise, this can be started postoperatively and continued during regular follow-up visits. If the patient's L-Dex score increases above normal levels, that is a sign that lymphedema is developing and a referral is made to physical therapy for further evaluation and

## 2025-01-27 ENCOUNTER — TELEPHONE (OUTPATIENT)
Facility: HOSPITAL | Age: 70
End: 2025-01-27

## 2025-01-27 ENCOUNTER — TELEPHONE (OUTPATIENT)
Age: 70
End: 2025-01-27

## 2025-01-27 NOTE — TELEPHONE ENCOUNTER
Reached out to PT to schedule genetic counseling consult. No answer, LVM.    Pre-Test Consult/Breast ca/Dr. Kareem Pruitt

## 2025-01-27 NOTE — TELEPHONE ENCOUNTER
[facility]  Breast Navigator Encounter    Name:    Darya Lizama  Age:    69 y.o.  Diagnosis:   RIGHT breast cancer    In last Wednesday.  Has not heard about a surgery date yet.  I told her I would send a message to the .      Talked about genetics, and I already sent a message to YVONNE Torres to call her for an appointment.  I explained the procedure of collecting the specimen.      She was appreciative of the return call.                             Lyn Morgan RN, BSN, [facility]  Oncology Breast Navigator     [address]  [address]  W: [phone]  F: [phone]  [email or web address]  Good Help to Those in Need®

## 2025-01-27 NOTE — PROGRESS NOTES
[facility] [place] [facility] [place]  A Part of [facility]  Genetic Counseling   [address]  [place] [address]  Phone: [phone]  Fax: [phone]    Date of Visit: 1/28/2025  Patient Name: Darya Lizama  YOB: 1955  Referring Provider: Kareem Pruitt MD ([facility])      HISTORY OF PRESENT ILLNESS  Darya Lizama is a 69 y.o. female referred by Dr. Edmond Lopez for genetic counseling due to a new breast cancer diagnosis found on annual screening. We reviewed her medical and family history and discussed germline genetic testing options, including the implications of the different types of possible test results.     The appointment was conducted in person and Ms. Lizama was unaccompanied to the appointment.     Medical History  Cancer History:  Right breast cancer diagnosed at 69y.  12/31/24: RIGHT breast biopsy: IMC with lobular features, favor histologic grade II (of III), spanning 11 mm in greatest dimension. ER+ (%)/AZ+(%)/HER2-, Ki-67 20%. Clinical stage 1.   Planning for lumpectomy, not scheduled yet.     Gynecologic History:  Onset of menses at age 13y. 2 children. First completed pregnancy at age 28y.   2002 - partial hysterectomy due to uterine prolapse and fibroids.  Ovaries intact    Cancer Screening History:  Date of last Cervical Cancer screen (HPV or PAP): 10/10/2012  Date of last Colonoscopy: 5/19/2017 ([facility]). Two small (<1 cm) sessile polyps in the rectum.  Cumulative lifetime polyps: 2  Date of last EGD: 5/19/2017 ([facility]). 2 cm esophageal nodule.   Date of last full body skin exam: October 2024.    Social History     Tobacco Use    Smoking status: Never    Smokeless tobacco: Never   Substance Use Topics    Alcohol use: No     Family History  A three-generation pedigree was collected at the time of the appointment based on patient report in the absence of complete medical records.

## 2025-01-28 ENCOUNTER — INITIAL CONSULT (OUTPATIENT)
Age: 70
End: 2025-01-28

## 2025-01-28 DIAGNOSIS — C50.911 INVASIVE DUCTAL CARCINOMA OF BREAST, FEMALE, RIGHT (HCC): ICD-10-CM

## 2025-01-28 DIAGNOSIS — Z71.83 ENCOUNTER FOR NONPROCREATIVE GENETIC COUNSELING AND TESTING: Primary | ICD-10-CM

## 2025-01-28 DIAGNOSIS — Z13.71 ENCOUNTER FOR NONPROCREATIVE GENETIC COUNSELING AND TESTING: Primary | ICD-10-CM

## 2025-01-29 ENCOUNTER — TELEPHONE (OUTPATIENT)
Age: 70
End: 2025-01-29

## 2025-01-29 NOTE — TELEPHONE ENCOUNTER
Returned PT's call requesting to speak to Gayle. PT stated she would like to know if she is waiting for her genetic testing results to proceed with surgical decisions.    # [phone]

## 2025-01-30 NOTE — TELEPHONE ENCOUNTER
Returned patient's call, spoke with patient via phone.    Pt calling to check because she has not heard from Dr. Pruitt's office for surgical scheduling and inquired if they were waiting to call until after genetic results are back. Explained that pending genetic testing will not prevent Dr. Pruitt's office from calling to schedule surgery. Provided reassurance that it can take a few days to hear from surgical scheduling but that is is okay to check in with [facility] or ROBINSON Barragan.    Pt inquired if she should wait to schedule surgery until after genetic results return. Reviewed that this choice is based on shared decision making between the patient and  Dr. Pruitt. Reviewed potential implications of positive result, including possibility of increased contralateral risk (age dependent, likely lower than if first primary was diagnosed at young age).

## 2025-01-31 ENCOUNTER — PREP FOR PROCEDURE (OUTPATIENT)
Age: 70
End: 2025-01-31

## 2025-01-31 DIAGNOSIS — Z17.1 MALIGNANT NEOPLASM OF RIGHT BREAST IN FEMALE, ESTROGEN RECEPTOR NEGATIVE, UNSPECIFIED SITE OF BREAST: Primary | ICD-10-CM

## 2025-01-31 DIAGNOSIS — C50.911 MALIGNANT NEOPLASM OF RIGHT BREAST IN FEMALE, ESTROGEN RECEPTOR NEGATIVE, UNSPECIFIED SITE OF BREAST: Primary | ICD-10-CM

## 2025-02-03 DIAGNOSIS — Z17.0 MALIGNANT NEOPLASM OF RIGHT BREAST IN FEMALE, ESTROGEN RECEPTOR POSITIVE, UNSPECIFIED SITE OF BREAST (HCC): Primary | ICD-10-CM

## 2025-02-03 DIAGNOSIS — C50.911 MALIGNANT NEOPLASM OF RIGHT BREAST IN FEMALE, ESTROGEN RECEPTOR POSITIVE, UNSPECIFIED SITE OF BREAST (HCC): Primary | ICD-10-CM

## 2025-02-05 NOTE — PROGRESS NOTES
[facility] [place] [facility] [place]  A Part of [facility]  Genetic Counseling   [address] [place] [address] [address]  [place] [address]  Phone: [phone]  Fax: [phone]    Date: 2/7/2025  Patient Name: Darya Lizama  YOB: 1955  Referring Provider: Kareem Pruitt MD ([facility])    INDICATION: Disclosure of germline genetic test results by phone.    INTERVAL HISTORY: Darya Lizama is a 69 y.o. female who recently underwent germline genetic testing due to a new breast cancer diagnosis found on annual screening. I spoke with Ms. Lizama by phone today to review the results of her genetic testing and their implications.    TEST RESULTS: NEGATIVE  No pathogenic mutations, variants of unknown significance, or gross deletions or duplications were detected.  [facility]  Genes Analyzed (39 total): APC, FAVIAN, BAP1, BARD1, BMPR1A, BRCA1, BRCA2, BRIP1, CDH1, CDKN2A, CHEK2, FH, FLCN, MET, MLH1, MSH2, MSH6, MUTYH, NF1, NTHL1, PALB2, PMS2, PTEN, RAD51C, RAD51D, SMAD4, STK11, TP53, TSC1, TSC2 and VHL (sequencing and deletion/duplication); AXIN2, HOXB13, MBD4, MSH3, POLD1 and POLE (sequencing only); EPCAM and GREM1 (deletion/duplication only).  A full copy of results is available in the patient's record for additional details.     INTERPRETATION & DISCUSSION:  Genetic testing did not identify any genetic mutations associated with a hereditary cancer syndrome. This test cannot exclude the possibility of a mutation in this patient in a gene which was not included in her analysis. The patient may check in with our clinic and/or her other healthcare providers periodically to determine if there have been any updates to our knowledge on genes associated with inherited cancer predisposition syndromes.  The treatment and management plan for the patient's current diagnosis should not be changed based on the negative test result.  The patient's risk for

## 2025-02-07 ENCOUNTER — TELEPHONE (OUTPATIENT)
Age: 70
End: 2025-02-07

## 2025-02-07 ENCOUNTER — SCHEDULED TELEPHONE ENCOUNTER (OUTPATIENT)
Age: 70
End: 2025-02-07

## 2025-02-07 DIAGNOSIS — Z13.71 BRCA1 NEGATIVE: Primary | ICD-10-CM

## 2025-02-07 DIAGNOSIS — Z13.71 BRCA2 NEGATIVE: ICD-10-CM

## 2025-02-07 DIAGNOSIS — Z71.83 ENCOUNTER FOR NONPROCREATIVE GENETIC COUNSELING: ICD-10-CM

## 2025-02-07 NOTE — TELEPHONE ENCOUNTER
Attempted to call patient for scheduled results disclosure via phone, no answer. Left voicemail requesting call back at [phone]. Did not disclose results via voicemail.

## 2025-02-10 ENCOUNTER — TELEPHONE (OUTPATIENT)
Age: 70
End: 2025-02-10

## 2025-02-10 NOTE — TELEPHONE ENCOUNTER
Patient returned my call. Verified identifiers. Gave low risk mammaprint results and explanation of results. Patient verbalized understanding. No other questions at this time. Patient appreciative of call

## 2025-02-11 ENCOUNTER — CLINICAL DOCUMENTATION (OUTPATIENT)
Age: 70
End: 2025-02-11

## 2025-02-12 ENCOUNTER — HOSPITAL ENCOUNTER (OUTPATIENT)
Facility: HOSPITAL | Age: 70
Discharge: HOME | End: 2025-02-15
Attending: SURGERY
Payer: MEDICARE

## 2025-02-12 DIAGNOSIS — C50.911 MALIGNANT NEOPLASM OF RIGHT BREAST IN FEMALE, ESTROGEN RECEPTOR POSITIVE, UNSPECIFIED SITE OF BREAST (HCC): ICD-10-CM

## 2025-02-12 DIAGNOSIS — Z17.0 MALIGNANT NEOPLASM OF RIGHT BREAST IN FEMALE, ESTROGEN RECEPTOR POSITIVE, UNSPECIFIED SITE OF BREAST (HCC): ICD-10-CM

## 2025-02-12 PROCEDURE — 6360000002 HC RX W HCPCS: Performed by: RADIOLOGY

## 2025-02-12 PROCEDURE — 19283 PERQ DEV BREAST 1ST STRTCTC: CPT

## 2025-02-12 RX ORDER — LIDOCAINE HYDROCHLORIDE 10 MG/ML
5 INJECTION, SOLUTION INFILTRATION; PERINEURAL ONCE
Status: COMPLETED | OUTPATIENT
Start: 2025-02-12 | End: 2025-02-12

## 2025-02-12 RX ADMIN — LIDOCAINE HYDROCHLORIDE 5 ML: 10 INJECTION, SOLUTION INFILTRATION; PERINEURAL at 11:35

## 2025-02-12 NOTE — PROGRESS NOTES
Patient tolerated right breast fortino seed placement well with scabt bleeding. Site was covered with a gauze and tegaderm. Discharge instructions were reviewed with the patient and she was provided with a written copy as well. Encouraged her to call with any questions or concerns.

## 2025-02-21 ENCOUNTER — HOSPITAL ENCOUNTER (OUTPATIENT)
Facility: HOSPITAL | Age: 70
Discharge: HOME | End: 2025-02-24
Payer: MEDICARE

## 2025-02-21 VITALS
BODY MASS INDEX: 27.21 KG/M2 | TEMPERATURE: 98.5 F | HEIGHT: 59 IN | SYSTOLIC BLOOD PRESSURE: 128 MMHG | HEART RATE: 88 BPM | OXYGEN SATURATION: 99 % | WEIGHT: 135 LBS | DIASTOLIC BLOOD PRESSURE: 83 MMHG

## 2025-02-21 LAB
BASOPHILS # BLD: 0.02 K/UL (ref 0–0.1)
BASOPHILS NFR BLD: 0.6 % (ref 0–1)
DIFFERENTIAL METHOD BLD: NORMAL
EOSINOPHIL # BLD: 0.22 K/UL (ref 0–0.4)
EOSINOPHIL NFR BLD: 6.1 % (ref 0–7)
ERYTHROCYTE [DISTWIDTH] IN BLOOD BY AUTOMATED COUNT: 11.9 % (ref 11.5–14.5)
HCT VFR BLD AUTO: 37.8 % (ref 35–47)
HGB BLD-MCNC: 12.6 G/DL (ref 11.5–16)
IMM GRANULOCYTES # BLD AUTO: 0.01 K/UL (ref 0–0.04)
IMM GRANULOCYTES NFR BLD AUTO: 0.3 % (ref 0–0.5)
LYMPHOCYTES # BLD: 1.21 K/UL (ref 0.8–3.5)
LYMPHOCYTES NFR BLD: 33.6 % (ref 12–49)
MCH RBC QN AUTO: 29 PG (ref 26–34)
MCHC RBC AUTO-ENTMCNC: 33.3 G/DL (ref 30–36.5)
MCV RBC AUTO: 87.1 FL (ref 80–99)
MONOCYTES # BLD: 0.29 K/UL (ref 0–1)
MONOCYTES NFR BLD: 8.1 % (ref 5–13)
NEUTS SEG # BLD: 1.85 K/UL (ref 1.8–8)
NEUTS SEG NFR BLD: 51.3 % (ref 32–75)
NRBC # BLD: 0 K/UL (ref 0–0.01)
NRBC BLD-RTO: 0 PER 100 WBC
PLATELET # BLD AUTO: 260 K/UL (ref 150–400)
PMV BLD AUTO: 9.7 FL (ref 8.9–12.9)
RBC # BLD AUTO: 4.34 M/UL (ref 3.8–5.2)
WBC # BLD AUTO: 3.6 K/UL (ref 3.6–11)

## 2025-02-21 PROCEDURE — 36415 COLL VENOUS BLD VENIPUNCTURE: CPT

## 2025-02-21 PROCEDURE — 85025 COMPLETE CBC W/AUTO DIFF WBC: CPT

## 2025-02-21 NOTE — TELEPHONE ENCOUNTER
Prior authorization for CPT 77516 Surgery at [place] 2-  Auth# [id number] valid 2- until 5/26/2025

## 2025-02-21 NOTE — PERIOP NOTE
[address] [address]   MAIN OR                                  [phone]    MAIN PRE OP             [phone]                                                                                AMBULATORY PRE OP          [phone]  PRE-ADMISSION TESTING    [phone]     Surgery Date:  2/25/25       Is surgery arrival time given by surgeon?  YES  NO    If “NO”, [facility] staff will call you between 4 and 7pm the day before your surgery with your arrival time. (If your surgery is on a Monday, we will call you the Friday before.)    Call [phone] after 7pm Monday-Friday if you did not receive this call.    INSTRUCTIONS BEFORE YOUR SURGERY   When You  Arrive Arrive at [facility] Patient Access on 1st floor the day of your surgery.  Have your insurance card, photo ID,living will/advanced directive/POA (if applicable),  and any copayment (if needed)   Food   and   Drink NO solid food after midnight the night before surgery. You can drink clear liquids from midnight until ONE hour prior to your arrival at the hospital on the day of your surgery. Clear liquids include:  Water  Apple juice (no sediment)  Carbonated beverages  Black coffee(no cream/milk)  Tea(no cream/milk)  Gatorade    No alcohol (beer, wine, liquor) or marijuana (smoking) 24 hours, edibles (3 days). Stop smoking cigarettes 14 days before surgery (helps w/healing and breathing).   Medications to   TAKE   Morning of Surgery MEDICATIONS TO TAKE THE MORNING OF SURGERY WITH A SIP OF WATER: NONE       Medications to STOP  before surgery Non-Steroidal anti-inflammatory Drugs (NSAID's): for example, Diclofenac (Voltaren), Ibuprofen (Advil, Motrin), Naproxen (Aleve) 3 days    STOP all herbal supplements and vitamins(unless prescribed by your doctor), and fish oil for 7 days  STOP TODAY      (Pain medications not listed above, including Tylenol may be taken up until 4 hours prior to  arrival time)   Blood  Thinners If you take Aspirin, Eliquis, Plavix, Coumadin, or any blood-thinning or anti-blood clot medicine, talk to the doctor who prescribed the medications for pre-operative instructions.  If you take aspirin or aspirin containing products for pain, stop 7 days prior to surgery   Bathing Clothing  Jewelry  Valuables     When you shower the morning of surgery, please do not apply anything to your skin (lotions, powders, deodorant (if having breast surgery), or makeup, especially mascara). Remove fingernail polish except for clear.  Do not shave or trim anywhere 24 hours before surgery  Wear your hair loose or down; no pony-tails, buns, or metal hair clips  Wear loose, comfortable, clean clothes  Wear glasses instead of contacts. Bring a case to keep your glasses safe.  Leave money, valuables, and jewelry, including body piercings, at home  If you use inhalers or CPAP machine, bring it with you the day of surgery.     Going Home - or Spending the Night OUTPATIENT SURGERY: You must have a responsible adult drive you home and stay with you 24 hours after surgery. You may not drive for 24 hours after surgery.  ADMITS: If your doctor is keeping you in the hospital after surgery, leave personal belongings/luggage in your car until you have a hospital room number.    Hospital discharge time is 12 noon  Drivers must be here before 12 noon unless you are told differently   Special Instructions Free  parking available from 6 AM until 4:30 PM.         Preventing Infections Before and After - Your Surgery    IMPORTANT INSTRUCTIONS      You play an important role in your health and preparation for surgery. To reduce the germs on your skin you will need to shower with CHG soap (Chorhexidine gluconate 4%) two times before surgery.    CHG soap (Hibiclens, Hex-A-Clens or store brand)  CHG soap will be provided at your Preadmission Testing (PAT) appointment.  If you do not have a PAT appointment before

## 2025-02-24 ENCOUNTER — ANESTHESIA EVENT (OUTPATIENT)
Facility: HOSPITAL | Age: 70
End: 2025-02-24
Payer: MEDICARE

## 2025-02-24 RX ORDER — SODIUM CHLORIDE 0.9 % (FLUSH) 0.9 %
5-40 SYRINGE (ML) INJECTION PRN
Status: CANCELLED | OUTPATIENT
Start: 2025-02-24

## 2025-02-24 RX ORDER — FENTANYL CITRATE 50 UG/ML
25 INJECTION, SOLUTION INTRAMUSCULAR; INTRAVENOUS EVERY 5 MIN PRN
Status: CANCELLED | OUTPATIENT
Start: 2025-02-24

## 2025-02-24 RX ORDER — PROCHLORPERAZINE EDISYLATE 5 MG/ML
5 INJECTION INTRAMUSCULAR; INTRAVENOUS
Status: CANCELLED | OUTPATIENT
Start: 2025-02-24 | End: 2025-02-25

## 2025-02-24 RX ORDER — ONDANSETRON 2 MG/ML
4 INJECTION INTRAMUSCULAR; INTRAVENOUS
Status: CANCELLED | OUTPATIENT
Start: 2025-02-24 | End: 2025-02-25

## 2025-02-24 RX ORDER — HYDROMORPHONE HYDROCHLORIDE 1 MG/ML
0.5 INJECTION, SOLUTION INTRAMUSCULAR; INTRAVENOUS; SUBCUTANEOUS EVERY 5 MIN PRN
Status: CANCELLED | OUTPATIENT
Start: 2025-02-24

## 2025-02-24 RX ORDER — SODIUM CHLORIDE 0.9 % (FLUSH) 0.9 %
5-40 SYRINGE (ML) INJECTION EVERY 12 HOURS SCHEDULED
Status: CANCELLED | OUTPATIENT
Start: 2025-02-24

## 2025-02-24 RX ORDER — SODIUM CHLORIDE 9 MG/ML
INJECTION, SOLUTION INTRAVENOUS PRN
Status: CANCELLED | OUTPATIENT
Start: 2025-02-24

## 2025-02-24 RX ORDER — NALOXONE HYDROCHLORIDE 0.4 MG/ML
INJECTION, SOLUTION INTRAMUSCULAR; INTRAVENOUS; SUBCUTANEOUS PRN
Status: CANCELLED | OUTPATIENT
Start: 2025-02-24

## 2025-02-24 RX ORDER — LABETALOL HYDROCHLORIDE 5 MG/ML
10 INJECTION, SOLUTION INTRAVENOUS
Status: CANCELLED | OUTPATIENT
Start: 2025-02-24

## 2025-02-24 NOTE — ANESTHESIA PRE PROCEDURE
Department of Anesthesiology  Preprocedure Note       Name:  Darya Lizama   Age:  69 y.o.  :  1955                                          MRN:  [id number]         Date:  2025      Surgeon: Surgeon(s):  Kareem Pruitt Jr, MD    Procedure: Procedure(s):  RIGHT BREAST LUMPECTOMY, RIGHT BREAST SENTINEL NODE BIOPSY (SENT NODE  94)    Medications prior to admission:   Prior to Admission medications    Medication Sig Start Date End Date Taking? Authorizing Provider   Calcium Carb-Cholecalciferol (CALCIUM 600+D3 PO) Take by mouth 2 times daily Daily Total: Calcium 1200 mg, Vitamin D3 1,000 units    Provider, MD Apolinar   Multiple Vitamin (MULTIVITAMIN ADULT PO) Take 1 tablet by mouth daily    ProviderApolinar MD   fluticasone (FLONASE) 50 MCG/ACT nasal spray 2 sprays by Nasal route daily as needed (spring allergies) 14   Automatic Reconciliation, Ar       Current medications:    No current facility-administered medications for this encounter.     Current Outpatient Medications   Medication Sig Dispense Refill    Calcium Carb-Cholecalciferol (CALCIUM 600+D3 PO) Take by mouth 2 times daily Daily Total: Calcium 1200 mg, Vitamin D3 1,000 units      Multiple Vitamin (MULTIVITAMIN ADULT PO) Take 1 tablet by mouth daily      fluticasone (FLONASE) 50 MCG/ACT nasal spray 2 sprays by Nasal route daily as needed (spring allergies)         Allergies:  No Known Allergies    Problem List:    Patient Active Problem List   Diagnosis Code    Seasonal allergic rhinitis J30.2    Impaired fasting glucose R73.01    Dysphagia R13.10    Uterine prolapse N81.4    Prediabetes R73.03    Screening exam for skin cancer Z12.83    History of eczema Z87.2    Menopause Z78.0    Invasive ductal carcinoma of breast, female, right (HCC) C50.911    Malignant neoplasm of right breast (HCC) C50.911    BRCA1 negative Z13.71    BRCA2 negative Z13.71       Past Medical History:        Diagnosis Date    Breast cancer

## 2025-02-25 ENCOUNTER — APPOINTMENT (OUTPATIENT)
Facility: HOSPITAL | Age: 70
End: 2025-02-25
Attending: SURGERY
Payer: MEDICARE

## 2025-02-25 ENCOUNTER — HOSPITAL ENCOUNTER (OUTPATIENT)
Facility: HOSPITAL | Age: 70
Setting detail: OUTPATIENT SURGERY
Discharge: HOME | End: 2025-02-25
Attending: SURGERY | Admitting: SURGERY
Payer: MEDICARE

## 2025-02-25 ENCOUNTER — ANESTHESIA (OUTPATIENT)
Facility: HOSPITAL | Age: 70
End: 2025-02-25
Payer: MEDICARE

## 2025-02-25 VITALS
HEIGHT: 59 IN | DIASTOLIC BLOOD PRESSURE: 53 MMHG | SYSTOLIC BLOOD PRESSURE: 118 MMHG | TEMPERATURE: 97.3 F | OXYGEN SATURATION: 96 % | RESPIRATION RATE: 18 BRPM | HEART RATE: 97 BPM | BODY MASS INDEX: 27.21 KG/M2 | WEIGHT: 135 LBS

## 2025-02-25 DIAGNOSIS — Z17.0 MALIGNANT NEOPLASM OF RIGHT BREAST IN FEMALE, ESTROGEN RECEPTOR POSITIVE, UNSPECIFIED SITE OF BREAST (HCC): ICD-10-CM

## 2025-02-25 DIAGNOSIS — C50.911 MALIGNANT NEOPLASM OF RIGHT BREAST IN FEMALE, ESTROGEN RECEPTOR NEGATIVE, UNSPECIFIED SITE OF BREAST (HCC): ICD-10-CM

## 2025-02-25 DIAGNOSIS — Z17.1 MALIGNANT NEOPLASM OF RIGHT BREAST IN FEMALE, ESTROGEN RECEPTOR NEGATIVE, UNSPECIFIED SITE OF BREAST (HCC): ICD-10-CM

## 2025-02-25 DIAGNOSIS — C50.911 MALIGNANT NEOPLASM OF RIGHT BREAST IN FEMALE, ESTROGEN RECEPTOR POSITIVE, UNSPECIFIED SITE OF BREAST (HCC): ICD-10-CM

## 2025-02-25 PROCEDURE — 38525 BIOPSY/REMOVAL LYMPH NODES: CPT | Performed by: SURGERY

## 2025-02-25 PROCEDURE — 2709999900 HC NON-CHARGEABLE SUPPLY: Performed by: SURGERY

## 2025-02-25 PROCEDURE — 3600000003 HC SURGERY LEVEL 3 BASE: Performed by: SURGERY

## 2025-02-25 PROCEDURE — 78195 LYMPH SYSTEM IMAGING: CPT

## 2025-02-25 PROCEDURE — 7100000000 HC PACU RECOVERY - FIRST 15 MIN: Performed by: SURGERY

## 2025-02-25 PROCEDURE — 2720000010 HC SURG SUPPLY STERILE: Performed by: SURGERY

## 2025-02-25 PROCEDURE — A9520 TC99 TILMANOCEPT DIAG 0.5MCI: HCPCS | Performed by: SURGERY

## 2025-02-25 PROCEDURE — 88307 TISSUE EXAM BY PATHOLOGIST: CPT

## 2025-02-25 PROCEDURE — 3700000000 HC ANESTHESIA ATTENDED CARE: Performed by: SURGERY

## 2025-02-25 PROCEDURE — 3600000013 HC SURGERY LEVEL 3 ADDTL 15MIN: Performed by: SURGERY

## 2025-02-25 PROCEDURE — 3430000000 HC RX DIAGNOSTIC RADIOPHARMACEUTICAL: Performed by: SURGERY

## 2025-02-25 PROCEDURE — 14301 TIS TRNFR ANY 30.1-60 SQ CM: CPT | Performed by: SURGERY

## 2025-02-25 PROCEDURE — 3700000001 HC ADD 15 MINUTES (ANESTHESIA): Performed by: SURGERY

## 2025-02-25 PROCEDURE — 2580000003 HC RX 258: Performed by: NURSE ANESTHETIST, CERTIFIED REGISTERED

## 2025-02-25 PROCEDURE — 19301 PARTIAL MASTECTOMY: CPT | Performed by: SURGERY

## 2025-02-25 PROCEDURE — 6360000002 HC RX W HCPCS

## 2025-02-25 PROCEDURE — 7100000001 HC PACU RECOVERY - ADDTL 15 MIN: Performed by: SURGERY

## 2025-02-25 PROCEDURE — 6360000002 HC RX W HCPCS: Performed by: NURSE ANESTHETIST, CERTIFIED REGISTERED

## 2025-02-25 PROCEDURE — 38900 IO MAP OF SENT LYMPH NODE: CPT | Performed by: SURGERY

## 2025-02-25 PROCEDURE — 6360000002 HC RX W HCPCS: Performed by: SURGERY

## 2025-02-25 PROCEDURE — 76998 US GUIDE INTRAOP: CPT | Performed by: SURGERY

## 2025-02-25 RX ORDER — MIDAZOLAM HYDROCHLORIDE 1 MG/ML
INJECTION, SOLUTION INTRAMUSCULAR; INTRAVENOUS
Status: DISCONTINUED | OUTPATIENT
Start: 2025-02-25 | End: 2025-02-25 | Stop reason: SDUPTHER

## 2025-02-25 RX ORDER — CEFAZOLIN SODIUM 1 G/3ML
INJECTION, POWDER, FOR SOLUTION INTRAMUSCULAR; INTRAVENOUS
Status: DISCONTINUED | OUTPATIENT
Start: 2025-02-25 | End: 2025-02-25 | Stop reason: SDUPTHER

## 2025-02-25 RX ORDER — OXYCODONE AND ACETAMINOPHEN 5; 325 MG/1; MG/1
1 TABLET ORAL EVERY 4 HOURS PRN
Qty: 15 TABLET | Refills: 0 | Status: SHIPPED | OUTPATIENT
Start: 2025-02-25 | End: 2025-02-28

## 2025-02-25 RX ORDER — ONDANSETRON 2 MG/ML
INJECTION INTRAMUSCULAR; INTRAVENOUS
Status: DISCONTINUED | OUTPATIENT
Start: 2025-02-25 | End: 2025-02-25 | Stop reason: SDUPTHER

## 2025-02-25 RX ORDER — HYDROMORPHONE HYDROCHLORIDE 2 MG/ML
INJECTION, SOLUTION INTRAMUSCULAR; INTRAVENOUS; SUBCUTANEOUS
Status: DISCONTINUED | OUTPATIENT
Start: 2025-02-25 | End: 2025-02-25 | Stop reason: SDUPTHER

## 2025-02-25 RX ORDER — LIDOCAINE HYDROCHLORIDE AND EPINEPHRINE 10; 10 MG/ML; UG/ML
30 INJECTION, SOLUTION INFILTRATION; PERINEURAL ONCE
Status: CANCELLED | OUTPATIENT
Start: 2025-02-25 | End: 2025-02-25

## 2025-02-25 RX ORDER — FENTANYL CITRATE 50 UG/ML
INJECTION, SOLUTION INTRAMUSCULAR; INTRAVENOUS
Status: DISCONTINUED | OUTPATIENT
Start: 2025-02-25 | End: 2025-02-25 | Stop reason: SDUPTHER

## 2025-02-25 RX ORDER — SODIUM CHLORIDE, SODIUM LACTATE, POTASSIUM CHLORIDE, CALCIUM CHLORIDE 600; 310; 30; 20 MG/100ML; MG/100ML; MG/100ML; MG/100ML
INJECTION, SOLUTION INTRAVENOUS
Status: DISCONTINUED | OUTPATIENT
Start: 2025-02-25 | End: 2025-02-25 | Stop reason: SDUPTHER

## 2025-02-25 RX ORDER — BUPIVACAINE HYDROCHLORIDE 5 MG/ML
30 INJECTION, SOLUTION PERINEURAL ONCE
Status: CANCELLED | OUTPATIENT
Start: 2025-02-25 | End: 2025-02-25

## 2025-02-25 RX ORDER — SODIUM CHLORIDE 0.9 % (FLUSH) 0.9 %
5-40 SYRINGE (ML) INJECTION EVERY 12 HOURS SCHEDULED
Status: DISCONTINUED | OUTPATIENT
Start: 2025-02-25 | End: 2025-02-25 | Stop reason: HOSPADM

## 2025-02-25 RX ORDER — SODIUM CHLORIDE 9 MG/ML
INJECTION, SOLUTION INTRAVENOUS PRN
Status: DISCONTINUED | OUTPATIENT
Start: 2025-02-25 | End: 2025-02-25 | Stop reason: HOSPADM

## 2025-02-25 RX ORDER — PHENYLEPHRINE HCL IN 0.9% NACL 0.4MG/10ML
SYRINGE (ML) INTRAVENOUS
Status: DISCONTINUED | OUTPATIENT
Start: 2025-02-25 | End: 2025-02-25 | Stop reason: SDUPTHER

## 2025-02-25 RX ORDER — SODIUM CHLORIDE 0.9 % (FLUSH) 0.9 %
5-40 SYRINGE (ML) INJECTION PRN
Status: DISCONTINUED | OUTPATIENT
Start: 2025-02-25 | End: 2025-02-25 | Stop reason: HOSPADM

## 2025-02-25 RX ORDER — DEXAMETHASONE SODIUM PHOSPHATE 4 MG/ML
INJECTION, SOLUTION INTRA-ARTICULAR; INTRALESIONAL; INTRAMUSCULAR; INTRAVENOUS; SOFT TISSUE
Status: DISCONTINUED | OUTPATIENT
Start: 2025-02-25 | End: 2025-02-25 | Stop reason: SDUPTHER

## 2025-02-25 RX ADMIN — ONDANSETRON HYDROCHLORIDE 4 MG: 2 INJECTION INTRAMUSCULAR; INTRAVENOUS at 11:13

## 2025-02-25 RX ADMIN — HYDROMORPHONE HYDROCHLORIDE 0.6 MG: 2 INJECTION, SOLUTION INTRAMUSCULAR; INTRAVENOUS; SUBCUTANEOUS at 12:08

## 2025-02-25 RX ADMIN — MIDAZOLAM 2 MG: 1 INJECTION INTRAMUSCULAR; INTRAVENOUS at 10:52

## 2025-02-25 RX ADMIN — Medication 80 MCG: at 11:05

## 2025-02-25 RX ADMIN — SODIUM CHLORIDE, POTASSIUM CHLORIDE, SODIUM LACTATE AND CALCIUM CHLORIDE: 600; 310; 30; 20 INJECTION, SOLUTION INTRAVENOUS at 09:51

## 2025-02-25 RX ADMIN — TILMANOCEPT 0.5 MILLICURIE: KIT at 09:25

## 2025-02-25 RX ADMIN — Medication 80 MCG: at 11:19

## 2025-02-25 RX ADMIN — PROPOFOL 100 MG: 10 INJECTION, EMULSION INTRAVENOUS at 11:01

## 2025-02-25 RX ADMIN — DEXAMETHASONE SODIUM PHOSPHATE 4 MG: 4 INJECTION, SOLUTION INTRAMUSCULAR; INTRAVENOUS at 11:13

## 2025-02-25 RX ADMIN — FENTANYL CITRATE 50 MCG: 50 INJECTION INTRAMUSCULAR; INTRAVENOUS at 11:00

## 2025-02-25 RX ADMIN — FENTANYL CITRATE 50 MCG: 50 INJECTION INTRAMUSCULAR; INTRAVENOUS at 10:52

## 2025-02-25 RX ADMIN — Medication 80 MCG: at 11:11

## 2025-02-25 RX ADMIN — CEFAZOLIN 2 G: 330 INJECTION, POWDER, FOR SOLUTION INTRAMUSCULAR; INTRAVENOUS at 11:06

## 2025-02-25 ASSESSMENT — PAIN SCALES - GENERAL
PAINLEVEL_OUTOF10: 0
PAINLEVEL_OUTOF10: 0

## 2025-02-25 ASSESSMENT — PAIN - FUNCTIONAL ASSESSMENT: PAIN_FUNCTIONAL_ASSESSMENT: NONE - DENIES PAIN

## 2025-02-25 NOTE — DISCHARGE INSTRUCTIONS
Discharge Instructions from Dr. Pruitt    I will call you with the pathology results, typically within 1 week from today.  You may shower, but no hot tubs, swimming pools, or baths until your incision is healed.  No heavy lifting with the affected extremity (nothing greater than 5 pounds), and limit its use for the next 4-5 days.  You may use an ice pack for comfort for the next couple of days, but do not place ice directly on the skin.  Rather, use a towel or clothing to serve as a barrier between skin and ice to prevent injury.  If I placed a drain, follow the drain instructions provided, especially as you keep a record of the drain output.  Follow medication instructions carefully.  Watch for signs of infection as listed below.  Redness  Swelling  Drainage from the incision or from your nipple that appears infected  Fever over 101 degrees for consecutive readings, or over 99.5 if you are currently undergoing chemotherapy.  Call our office (number is below) for a follow-up appointment.  If you have any problems, our phone number is [phone].      Your doctor has prescribed percocet for pain.  Percocet contains acetaminophen.  Please do not take any acetaminophen containing medications while taking percocet.

## 2025-02-25 NOTE — OP NOTE
[address]  [address]                            OPERATIVE REPORT      PATIENT NAME: YOBANI PERDOMO           : 1955  MED REC NO: [id number]                       ROOM: [facility]  ACCOUNT NO: [id number]                       ADMIT DATE: 2025  PROVIDER: Kareem Pruitt Jr, MD    DATE OF SERVICE:  2025    PREOPERATIVE DIAGNOSES:  Carcinoma of the right breast.    POSTOPERATIVE DIAGNOSES:  Carcinoma of the right breast.    PROCEDURES PERFORMED:  Right lumpectomy with intraoperative ultrasound and right sentinel lymph node biopsy.    SURGEON:  Kareem Pruitt Jr, MD    ASSISTANT:  Sheridan Hoffman SA    ANESTHESIA:  General.    ESTIMATED BLOOD LOSS:  Minimal.    SPECIMENS REMOVED:  Right breast mass and right axillary sentinel lymph nodes.    INTRAOPERATIVE FINDINGS:  [place] lymph nodes x3 and right breast mass.     COMPLICATIONS:  None.    IMPLANTS:  None.    INDICATIONS:  The patient is a 69-year-old female with above diagnosis.    DESCRIPTION OF PROCEDURE:  After lymphoscintigraphy in Radiology, the patient was brought to the operating room.  After the satisfactory induction of general LMA anesthesia, the patient was prepped and draped in sterile fashion.  An axillary incision was made, deepened through subcutaneous tissue with Bovie cautery.  Utilizing Neoprobe, 3 sentinel nodes were found in the deep axilla.  They were removed and sent for permanent section.  All dissection planes were hemostatic.  The wound was anesthetized with 0.5% Marcaine and closed with interrupted 3-0 Vicryl and a running subcuticular 4-0 Monocryl on skin.  Attention was then turned to the breast, where intraoperative ultrasound was performed.  The breast was marked.  An incision was made in the upper portion of the right breast and deepened through subcutaneous tissue with Bovie cautery.  A standard lumpectomy was then performed on the chest wall.

## 2025-02-25 NOTE — ANESTHESIA POSTPROCEDURE EVALUATION
Department of Anesthesiology  Postprocedure Note    Patient: Darya Lizama  MRN: [id number]  YOB: 1955  Date of evaluation: 2/25/2025    Procedure Summary       Date: 02/25/25 Room / Location: [facility] ASU A3 / [facility] AMBULATORY OR    Anesthesia Start: 1052 Anesthesia Stop: 1219    Procedure: RIGHT BREAST LUMPECTOMY, RIGHT BREAST SENTINEL NODE BIOPSY (SENT NODE 2/25 945) (Right: Breast) Diagnosis:       Malignant neoplasm of right breast (HCC)      (Malignant neoplasm of right breast (HCC) [C50.911])    Surgeons: Kareem Pruitt Jr, MD Responsible Provider: Geovanny Rosen MD    Anesthesia Type: General ASA Status: 2            Anesthesia Type: General    Bennie Phase I: Bennie Score: 9    Bennie Phase II:      Anesthesia Post Evaluation    Patient location during evaluation: PACU  Patient participation: complete - patient participated  Level of consciousness: awake  Airway patency: patent  Nausea & Vomiting: no nausea  Cardiovascular status: hemodynamically stable  Respiratory status: acceptable  Hydration status: stable  Pain management: adequate    No notable events documented.

## 2025-02-25 NOTE — H&P
HISTORY OF PRESENT ILLNESS  Darya Lizama is a 69 y.o. female.     HPI NEW patient consult referred by [facility] for RIGHT breast cancer. Patient's annual screening is what led to diagnosis. Patient denies any breast issues or concerns. Patient is accompanied by her  today.      12/31/24: RIGHT breast biopsy: IMC with lobular features, favor histologic grade II (of III), spanning 11 mm in greatest dimension. ER+ (%)/IA+(%)/HER2-, Ki-67 20%.      Family History: NO genetic testing  Paternal Grandmother, breast, unknown age diagnosis     MRI Result (most recent):  MRI BREAST BILATERAL W WO CONTRAST 01/13/2025     Narrative  MRI BREAST BILATERAL W WO CONTRAST, 1/13/2025 12:57 PM  INDICATION: Malignant neoplasm of unspecified site of right female breast / New  diagnosis of RIGHT breast cancer  HISTORY: Newly diagnosed invasive mammary carcinoma with lobular features in the  right breast  COMPARISON: Mammograms and ultrasound from December, 2024  .  TECHNIQUE:  Bilateral breast MRI was performed using a dedicated breast coil without  compression with the patient in the prone position. Precontrast T1-weighted  images with fat suppression were obtained followed by bolus injection of  contrast. Postcontrast dynamic and high-resolution images were acquired.  T2-weighted axial imaging with fat suppression was also performed. The images  were analyzed using software for kinetic analysis, enhancement curves, digital  subtraction, and reconstructions.  .  RIGHT BREAST:  Background parenchymal enhancement: Mild  There is a small area of irregular, masslike enhancement in the upper, inner  quadrant with an associated biopsy clip measuring 1 x 0.8 x 0.7 cm.     There are no suspicious internal mammary or axillary chain lymph nodes.  .  LEFT BREAST:  Background parenchymal enhancement: Mild  There is no suspicious masslike or nonmasslike enhancement within the left  breast to indicate breast carcinoma.     There

## 2025-02-25 NOTE — PERIOP NOTE
I have reviewed discharge instructions with the   Jw Lizama.  The   verbalized understanding. All questions addressed at this time. A paper copy of these instructions have been given to the patient to take home.

## 2025-02-27 ENCOUNTER — TELEPHONE (OUTPATIENT)
Age: 70
End: 2025-02-27

## 2025-02-28 ENCOUNTER — TELEPHONE (OUTPATIENT)
Age: 70
End: 2025-02-28

## 2025-02-28 DIAGNOSIS — Z17.1 MALIGNANT NEOPLASM OF RIGHT BREAST IN FEMALE, ESTROGEN RECEPTOR NEGATIVE, UNSPECIFIED SITE OF BREAST (HCC): Primary | ICD-10-CM

## 2025-02-28 DIAGNOSIS — C50.911 MALIGNANT NEOPLASM OF RIGHT BREAST IN FEMALE, ESTROGEN RECEPTOR NEGATIVE, UNSPECIFIED SITE OF BREAST (HCC): Primary | ICD-10-CM

## 2025-03-06 ENCOUNTER — TELEPHONE (OUTPATIENT)
Age: 70
End: 2025-03-06

## 2025-03-06 NOTE — TELEPHONE ENCOUNTER
Your radiation oncology appointment with Dr. Faith Goss is 03/20/2025 @ 10:30 am    Please arrive @ 10:00 am    [address] [facility] (sign in at outpatient registration)[place]  [address] [phone]        Bring ID, insurance card and co-pay if needed.    Please call their office if you need to cancel and reschedule.

## 2025-03-11 ENCOUNTER — TELEPHONE (OUTPATIENT)
Age: 70
End: 2025-03-11

## 2025-03-11 NOTE — TELEPHONE ENCOUNTER
Your medical oncology appointment with Dr. Catherine Sullivan is 04/01/2025 @ 1:00 pm    The address is [address] [place] [address], [place] [address]  [phone]    Please arrive 15 minutes early.  Bring your ID, insurance  card & co pay if needed.    Please call their office if you need to cancel and reschedule.

## 2025-03-19 ENCOUNTER — OFFICE VISIT (OUTPATIENT)
Age: 70
End: 2025-03-19

## 2025-03-19 VITALS — WEIGHT: 135 LBS | HEIGHT: 59 IN | BODY MASS INDEX: 27.21 KG/M2

## 2025-03-19 DIAGNOSIS — C50.911 MALIGNANT NEOPLASM OF RIGHT BREAST IN FEMALE, ESTROGEN RECEPTOR NEGATIVE, UNSPECIFIED SITE OF BREAST: Primary | ICD-10-CM

## 2025-03-19 DIAGNOSIS — Z17.1 MALIGNANT NEOPLASM OF RIGHT BREAST IN FEMALE, ESTROGEN RECEPTOR NEGATIVE, UNSPECIFIED SITE OF BREAST: Primary | ICD-10-CM

## 2025-03-19 PROCEDURE — 99024 POSTOP FOLLOW-UP VISIT: CPT | Performed by: SURGERY

## 2025-03-19 NOTE — PROGRESS NOTES
HISTORY OF PRESENT ILLNESS  Darya Lizama is a 69 y.o. female.    HPI ESTABLISHED patient here for post op s/p RIGHT lumpectomy and RIGHT SNBx on 25. Patient reports tightness in RUE when she raises her arm. Patient reports some swelling in RIGHT breast. Patient reports pain has been well controlled. Glue is still on incision on the RIGHT breast.      Patient has appt with rad onc on 3/20/25 and has appt with med onc on 25.     24: RIGHT breast biopsy: IMC with lobular features, favor histologic grade II (of III), spanning 11 mm in greatest dimension. ER+ (%)/ID+(%)/HER2-, Ki-67 20%.       Past Medical History:   Diagnosis Date    Breast cancer (HCC)     RIGHT BREAST    Dysphagia 2019    Eval 2017 ENT Dr. Perlman, GI Dr. Sauceda EGD Esophageal Dilatation     History of eczema 2010    Impaired fasting glucose 2019-    Menopause     LMP-     (normal spontaneous vaginal delivery) 2010    Prediabetes 9/15/2019    Screening exam for skin cancer 2019    2018 Derm Dr. Coronel    Seasonal allergic rhinitis 2010    Uterine prolapse 2010       Past Surgical History:   Procedure Laterality Date    BREAST LUMPECTOMY Right 2025    RIGHT BREAST LUMPECTOMY, RIGHT BREAST SENTINEL NODE BIOPSY (SENT NODE  945) performed by Kareem Pruitt Jr, MD at [facility]    COLONOSCOPY  2017    Repeat 10 yrs, Dr. Sauceda    COLONOSCOPY      Ok x 10yrs    DARION STEREO BREAST BX W LOC DEVICE 1ST LESION LEFT Right 2024    DARION 3D IGLESIA STEREO VAC BX BREAST RT 1ST LES W/CLIP SPEC 2024 [facility]    PARTIAL HYSTERECTOMY (CERVIX NOT REMOVED)      Uterine Prolapse & Fibroids    ID APPENDECTOMY      TUBAL LIGATION      UPPER GASTROINTESTINAL ENDOSCOPY  2017    Chronic gastritis and H Pylori positive-Dr Sauceda    WISDOM TOOTH EXTRACTION         Social History     Socioeconomic History    Marital status:

## 2025-03-19 NOTE — PROGRESS NOTES
HISTORY OF PRESENT ILLNESS  Darya Lizama is a 69 y.o. female     HPI ESTABLISHED patient here for post op s/p RIGHT lumpectomy and RIGHT SNBx on 2/25/25. Patient reports tightness in RUE when she raises her arm. Patient reports some swelling in RIGHT breast. Patient reports pain has been well controlled. Glue is still on incision on the RIGHT breast.     Patient has appt with rad onc on 3/20/25 and has appt with med onc on 4/1/25.     12/31/24: RIGHT breast biopsy: IMC with lobular features, favor histologic grade II (of III), spanning 11 mm in greatest dimension. ER+ (%)/[place]+(%)/HER2-, Ki-67 20%.     Review of Systems      Physical Exam       ASSESSMENT and PLAN  {Assessment and Plan Chronic Disease:[id number]}

## 2025-03-20 ENCOUNTER — HOSPITAL ENCOUNTER (OUTPATIENT)
Facility: HOSPITAL | Age: 70
Discharge: HOME | End: 2025-03-23
Payer: MEDICARE

## 2025-03-20 ENCOUNTER — CLINICAL DOCUMENTATION (OUTPATIENT)
Facility: HOSPITAL | Age: 70
End: 2025-03-20

## 2025-03-20 VITALS
SYSTOLIC BLOOD PRESSURE: 120 MMHG | HEART RATE: 85 BPM | HEIGHT: 59 IN | RESPIRATION RATE: 18 BRPM | WEIGHT: 136.84 LBS | DIASTOLIC BLOOD PRESSURE: 70 MMHG | BODY MASS INDEX: 27.59 KG/M2

## 2025-03-20 PROCEDURE — 99202 OFFICE O/P NEW SF 15 MIN: CPT

## 2025-03-20 ASSESSMENT — PAIN SCALES - GENERAL: PAINLEVEL_OUTOF10: 0

## 2025-03-20 NOTE — PROGRESS NOTES
Education was provided to the patient in the form of the following printed CLARIBEL booklet or booklets: Radiation Therapy for Breast Cancer    Total time spent on education with patient: minutes: 5-10 minutes

## 2025-03-20 NOTE — CONSULTS
(136 lb 13.4 oz). Pain Level: 0  Performance Status: ECOG 0, fully active, able to carry on all predisease activities without restrictions  Constitutional: Pleasant, sitting comfortably in no acute distress.   HEENT: Moist mucous membranes.  Cardiac: Good peripheral perfusion, no upper or lower extremity edema bilaterally.  Pulmonary: No increased work of breathing. Symmetric chest rise  Breast: The patient is s/p right breast lumpectomy and SLNBx. Surgical incisions (2) are healing well. There are no palpable masses or skin changes (skin nodules/skin thickening) noted to the bilateral breasts. No cervical axillary or supraclavicular lymphadenopathy noted bilaterally.  Skin: Warm, dry, no rashes noted.  Neurologic: Alert and oriented.  Psychiatric: Cooperative to commands and responds to questions appropriately.    Imaging:   Imaging reports were reviewed as detailed in her history above      Impression:   Ms. Lizama is a 69 y.o. 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    Treatment Intent: Definitive    Counseling:   Ms. Lizama's pertinent history, treatment details and imaging findings were discussed with her today. The natural history of breast cancer was reviewed. Prognostic factors including stage, subtype, hormone status, lymph node involvement, & presence of absence of metastases were reviewed with the patient. The standard treatment modalities including surgery, radiotherapy and systemic therapy (endocrine therapy, chemotherapy and immunotherapy) were also discussed.     Reviewed clinical trial options. May be eligible for PIERRE but would like adjuvant RT given lobular histology present on path. For the same reason (poor representation

## 2025-03-20 NOTE — PROGRESS NOTES
[facility]  Oncology Social Work Encounter    Location: Radiation Oncology at [facility]  Patient: Darya Lizama (1955)    Encounter Type: Initial SW Encounter      Concerns/Barriers to Care: Support    Narrative: Met with patient briefly. Patient reports no concerns. SW gave patient an overview of role, support groups, and therapeutic services available to our patients. Patient was receptive to the information and wanted more information on the support groups and therapeutic services. SW gave support group calendar and [place] flyer.    Information/Education/Referrals Provided:    Complimentary/Integrative Therapies  Support Groups/Peer Support     Plan: SW available for ongoing support.

## 2025-03-20 NOTE — PROGRESS NOTES
NCCN Distress Thermometer    Radiation Oncology at [facility]    Date Screening Completed: 3/20/2025    Screening Declined:  [] Yes    Number that best describes how much distress you've experienced in the past week, including today?  0 [x] - No distress 1 []      2 []      3 []      4 []       5 []       6 []      7 []      8 []      9 []       10 [] - Extreme distress    PROBLEM LIST  Have you had concerns about any of the items below in the past week, including today?      Physical Concerns Practical Concerns   [] Pain [] Taking care of myself    [] Sleep [] Taking care of others    [] Fatigue [] Safety   [] Tobacco use  [] Work   [] Substance use  [] School   [] Memory or concentration [] Housing/Utilities   [] Sexual health [] Finances   [] Changes in eating  [] Insurance   [] Loss or change of physical abilities  [] Transportation    []    Emotional Concerns [] Having enough food   [] Worry or anxiety [] Access to medicine   [] Sadness or depression [] Treatment decisions   [] Loss of interest or enjoyment     [] Grief or loss  Spiritual or Scientologist Concerns   [] Fear [] Sense of meaning or purpose   [] Loneliness  [] Changes in marine or beliefs   [] Anger [] Death, dying, or afterlife   [] Changes in appearance [] Conflict between beliefs and cancer treatments    [] Feelings of worthlessness or being a burden [] Relationship with the sacred    [] Ritual or dietary needs    Social Concerns     [] Relationship with spouse or partner     [] Relationship with children    [] Relationship with family members     [] Relationship with friends or coworkers     [] Communication with health care team     [] Ability to have children     [] Prejudice or discrimination        Other Concerns:   
DISPLAY PLAN FREE TEXT

## 2025-03-25 ENCOUNTER — HOSPITAL ENCOUNTER (OUTPATIENT)
Facility: HOSPITAL | Age: 70
Discharge: HOME | End: 2025-03-28
Attending: EMERGENCY MEDICINE

## 2025-04-01 ENCOUNTER — CLINICAL DOCUMENTATION (OUTPATIENT)
Age: 70
End: 2025-04-01

## 2025-04-01 ENCOUNTER — INITIAL CONSULT (OUTPATIENT)
Age: 70
End: 2025-04-01
Payer: MEDICARE

## 2025-04-01 VITALS
HEART RATE: 92 BPM | RESPIRATION RATE: 18 BRPM | TEMPERATURE: 98.8 F | DIASTOLIC BLOOD PRESSURE: 70 MMHG | WEIGHT: 136 LBS | BODY MASS INDEX: 27.47 KG/M2 | OXYGEN SATURATION: 96 % | SYSTOLIC BLOOD PRESSURE: 120 MMHG

## 2025-04-01 DIAGNOSIS — C50.911 INVASIVE DUCTAL CARCINOMA OF BREAST, FEMALE, RIGHT: Primary | ICD-10-CM

## 2025-04-01 PROCEDURE — 99215 OFFICE O/P EST HI 40 MIN: CPT | Performed by: INTERNAL MEDICINE

## 2025-04-01 PROCEDURE — 1090F PRES/ABSN URINE INCON ASSESS: CPT | Performed by: INTERNAL MEDICINE

## 2025-04-01 PROCEDURE — G8427 DOCREV CUR MEDS BY ELIG CLIN: HCPCS | Performed by: INTERNAL MEDICINE

## 2025-04-01 PROCEDURE — G8419 CALC BMI OUT NRM PARAM NOF/U: HCPCS | Performed by: INTERNAL MEDICINE

## 2025-04-01 PROCEDURE — 99205 OFFICE O/P NEW HI 60 MIN: CPT | Performed by: INTERNAL MEDICINE

## 2025-04-01 NOTE — PROGRESS NOTES
Darya Lizama is a 69 y.o. female    Chief Complaint   Patient presents with    New Patient     breast cancer       1. Have you been to the ER, urgent care clinic since your last visit?  Hospitalized since your last visit?No    2. Have you seen or consulted any other health care providers outside of the [facility] since your last visit?  Include any pap smears or colon screening. No

## 2025-04-01 NOTE — PROGRESS NOTES
NCCN Distress Thermometer    Medical Oncology at [facility]    Date Screening Completed: 4/1/2025    Screening Declined:  [] Yes    Number that best describes how much distress you've experienced in the past week, including today?  0 [x] - No distress 1 []      2 []      3 []      4 []       5 []       6 []      7 []      8 []      9 []       10 [] - Extreme distress    PROBLEM LIST  Have you had concerns about any of the items below in the past week, including today?      Physical Concerns Practical Concerns   [] Pain [] Taking care of myself    [] Sleep [] Taking care of others    [] Fatigue [] Safety   [] Tobacco use  [] Work   [] Substance use  [] School   [] Memory or concentration [] Housing/Utilities   [] Sexual health [] Finances   [] Changes in eating  [] Insurance   [] Loss or change of physical abilities  [] Transportation    []    Emotional Concerns [] Having enough food   [] Worry or anxiety [] Access to medicine   [] Sadness or depression [] Treatment decisions   [] Loss of interest or enjoyment     [] Grief or loss  Spiritual or Orthodoxy Concerns   [] Fear [] Sense of meaning or purpose   [] Loneliness  [] Changes in marine or beliefs   [] Anger [] Death, dying, or afterlife   [] Changes in appearance [] Conflict between beliefs and cancer treatments    [] Feelings of worthlessness or being a burden [] Relationship with the sacred    [] Ritual or dietary needs    Social Concerns     [] Relationship with spouse or partner     [] Relationship with children    [] Relationship with family members     [] Relationship with friends or coworkers     [] Communication with health care team     [] Ability to have children     [] Prejudice or discrimination        Other Concerns: n/a

## 2025-04-01 NOTE — PROGRESS NOTES
Cancer [place] at [facility]  [address] [place] [address], [address] [place] [address]  W: [phone]  F: [phone]    Reason for Visit:   Darya Lizama is a 69 y.o. female who is seen in consultation at the request of Dr. Pruitt for evaluation of breast cancer.    Treatment History:   24: RIGHT breast biopsy: IMC with lobular features, favor histologic grade II (of III), spanning 11 mm in greatest dimension. ER+ (%)/NE+(%)/HER2-, Ki-67 20%.     25 lumpectomy 1.8 cm grade 2 LN negative/ margins negative    STAGE: pT Category:  pT1c   pN Category:  pN0   Breast Biomarker Testing Performed on Previous Biopsy:         Estrogen Receptor (ER) Status:  Positive (greater than 10% of cells   demonstrate nuclear positivity)   Progesterone Receptor (PgR) Status:  Positive   HER2 (by immunohistochemistry):  Negative (Score 1+)   Ki-67 Percentage of Positive Nuclei:  20%   Testing Performed on Case Number:  [id number]     MP LOW RISK    History of Present Illness:     Pt seen today for office consult for new RIGHT breast cancer post lumpectomy 25.   MP low risk. Initially had abnormal screening mammo 10/24.   Fu mammo . Had breast biopsy  and IMC.   Breast MRI 1 cm mass.   MP low risk  Had lumpectomy 25.   Did well with surgery  Has seen Rad/onc  Here to discuss adjuvant systemic therapy  Feels fine overall today. No medical problems  No fevers/ chills/ chest pain/ SOB/ nausea/ vomiting/diarrhea/ pain/fatigue    Family History:   Paternal Grandmother, breast, unknown age diagnosis    Genetic testing negative.       Past Medical History:   Diagnosis Date    Breast cancer (HCC)     RIGHT BREAST    Dysphagia 2019    Eval 2017 ENT Dr. Perlman, GI Dr. Sauceda EGD Esophageal Dilatation     History of eczema 2010    Impaired fasting glucose 2019-    Menopause     LMP-2002     (normal spontaneous vaginal delivery) 2010

## 2025-04-07 LAB
RAD ONC ARIA COURSE FIRST TREATMENT DATE: NORMAL
RAD ONC ARIA COURSE ID: NORMAL
RAD ONC ARIA COURSE INTENT: NORMAL
RAD ONC ARIA COURSE LAST TREATMENT DATE: NORMAL
RAD ONC ARIA COURSE SESSION NUMBER: 1
RAD ONC ARIA COURSE START DATE: NORMAL
RAD ONC ARIA COURSE TREATMENT ELAPSED DAYS: 0
RAD ONC ARIA PLAN FRACTIONS TREATED TO DATE: 1
RAD ONC ARIA PLAN ID: NORMAL
RAD ONC ARIA PLAN PRESCRIBED DOSE PER FRACTION: 5.2 GY
RAD ONC ARIA PLAN PRIMARY REFERENCE POINT: NORMAL
RAD ONC ARIA PLAN TOTAL FRACTIONS PRESCRIBED: 5
RAD ONC ARIA PLAN TOTAL PRESCRIBED DOSE: 2600 CGY
RAD ONC ARIA REFERENCE POINT DOSAGE GIVEN TO DATE: 5.2 GY
RAD ONC ARIA REFERENCE POINT DOSAGE GIVEN TO DATE: 5.33 GY
RAD ONC ARIA REFERENCE POINT ID: NORMAL
RAD ONC ARIA REFERENCE POINT ID: NORMAL
RAD ONC ARIA REFERENCE POINT SESSION DOSAGE GIVEN: 5.2 GY
RAD ONC ARIA REFERENCE POINT SESSION DOSAGE GIVEN: 5.33 GY

## 2025-04-08 ENCOUNTER — HOSPITAL ENCOUNTER (OUTPATIENT)
Facility: HOSPITAL | Age: 70
Discharge: HOME | End: 2025-04-11
Attending: EMERGENCY MEDICINE

## 2025-04-08 LAB
RAD ONC ARIA COURSE FIRST TREATMENT DATE: NORMAL
RAD ONC ARIA COURSE ID: NORMAL
RAD ONC ARIA COURSE INTENT: NORMAL
RAD ONC ARIA COURSE LAST TREATMENT DATE: NORMAL
RAD ONC ARIA COURSE SESSION NUMBER: 2
RAD ONC ARIA COURSE START DATE: NORMAL
RAD ONC ARIA COURSE TREATMENT ELAPSED DAYS: 1
RAD ONC ARIA PLAN FRACTIONS TREATED TO DATE: 2
RAD ONC ARIA PLAN ID: NORMAL
RAD ONC ARIA PLAN PRESCRIBED DOSE PER FRACTION: 5.2 GY
RAD ONC ARIA PLAN PRIMARY REFERENCE POINT: NORMAL
RAD ONC ARIA PLAN TOTAL FRACTIONS PRESCRIBED: 5
RAD ONC ARIA PLAN TOTAL PRESCRIBED DOSE: 2600 CGY
RAD ONC ARIA REFERENCE POINT DOSAGE GIVEN TO DATE: 10.4 GY
RAD ONC ARIA REFERENCE POINT DOSAGE GIVEN TO DATE: 10.66 GY
RAD ONC ARIA REFERENCE POINT ID: NORMAL
RAD ONC ARIA REFERENCE POINT ID: NORMAL
RAD ONC ARIA REFERENCE POINT SESSION DOSAGE GIVEN: 5.2 GY
RAD ONC ARIA REFERENCE POINT SESSION DOSAGE GIVEN: 5.33 GY

## 2025-04-09 ENCOUNTER — HOSPITAL ENCOUNTER (OUTPATIENT)
Facility: HOSPITAL | Age: 70
Discharge: HOME | End: 2025-04-12
Attending: EMERGENCY MEDICINE

## 2025-04-09 LAB
RAD ONC ARIA COURSE FIRST TREATMENT DATE: NORMAL
RAD ONC ARIA COURSE ID: NORMAL
RAD ONC ARIA COURSE INTENT: NORMAL
RAD ONC ARIA COURSE LAST TREATMENT DATE: NORMAL
RAD ONC ARIA COURSE SESSION NUMBER: 3
RAD ONC ARIA COURSE START DATE: NORMAL
RAD ONC ARIA COURSE TREATMENT ELAPSED DAYS: 2
RAD ONC ARIA PLAN FRACTIONS TREATED TO DATE: 3
RAD ONC ARIA PLAN ID: NORMAL
RAD ONC ARIA PLAN PRESCRIBED DOSE PER FRACTION: 5.2 GY
RAD ONC ARIA PLAN PRIMARY REFERENCE POINT: NORMAL
RAD ONC ARIA PLAN TOTAL FRACTIONS PRESCRIBED: 5
RAD ONC ARIA PLAN TOTAL PRESCRIBED DOSE: 2600 CGY
RAD ONC ARIA REFERENCE POINT DOSAGE GIVEN TO DATE: 15.6 GY
RAD ONC ARIA REFERENCE POINT DOSAGE GIVEN TO DATE: 15.99 GY
RAD ONC ARIA REFERENCE POINT ID: NORMAL
RAD ONC ARIA REFERENCE POINT ID: NORMAL
RAD ONC ARIA REFERENCE POINT SESSION DOSAGE GIVEN: 5.2 GY
RAD ONC ARIA REFERENCE POINT SESSION DOSAGE GIVEN: 5.33 GY

## 2025-04-10 ENCOUNTER — HOSPITAL ENCOUNTER (OUTPATIENT)
Facility: HOSPITAL | Age: 70
Discharge: HOME | End: 2025-04-13
Attending: EMERGENCY MEDICINE

## 2025-04-10 LAB
RAD ONC ARIA COURSE FIRST TREATMENT DATE: NORMAL
RAD ONC ARIA COURSE ID: NORMAL
RAD ONC ARIA COURSE INTENT: NORMAL
RAD ONC ARIA COURSE LAST TREATMENT DATE: NORMAL
RAD ONC ARIA COURSE SESSION NUMBER: 4
RAD ONC ARIA COURSE START DATE: NORMAL
RAD ONC ARIA COURSE TREATMENT ELAPSED DAYS: 3
RAD ONC ARIA PLAN FRACTIONS TREATED TO DATE: 4
RAD ONC ARIA PLAN ID: NORMAL
RAD ONC ARIA PLAN PRESCRIBED DOSE PER FRACTION: 5.2 GY
RAD ONC ARIA PLAN PRIMARY REFERENCE POINT: NORMAL
RAD ONC ARIA PLAN TOTAL FRACTIONS PRESCRIBED: 5
RAD ONC ARIA PLAN TOTAL PRESCRIBED DOSE: 2600 CGY
RAD ONC ARIA REFERENCE POINT DOSAGE GIVEN TO DATE: 20.8 GY
RAD ONC ARIA REFERENCE POINT DOSAGE GIVEN TO DATE: 21.32 GY
RAD ONC ARIA REFERENCE POINT ID: NORMAL
RAD ONC ARIA REFERENCE POINT ID: NORMAL
RAD ONC ARIA REFERENCE POINT SESSION DOSAGE GIVEN: 5.2 GY
RAD ONC ARIA REFERENCE POINT SESSION DOSAGE GIVEN: 5.33 GY

## 2025-04-10 ASSESSMENT — PAIN SCALES - GENERAL: PAINLEVEL_OUTOF10: 0

## 2025-04-10 ASSESSMENT — PATIENT HEALTH QUESTIONNAIRE - PHQ9
1. LITTLE INTEREST OR PLEASURE IN DOING THINGS: NOT AT ALL
SUM OF ALL RESPONSES TO PHQ QUESTIONS 1-9: 0
2. FEELING DOWN, DEPRESSED OR HOPELESS: NOT AT ALL

## 2025-04-10 NOTE — ON TREATMENT VISIT
Cancer [place] at [facility]  [facility]    Radiation Oncology Weekly Progress Note  Encounter Date: 04/10/25     Darya Lizama  [id number]  1955     Care Team:  Dr. Edmond Sullivan    Diagnosis   C50.411; Z17.0     Ms. Darya Lizama is a 69 y.o. 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    AJCC Staging has been reviewed.  Oncologic History     The patient has a past medical history of preDM, eczema  10/2024- She was in her usual state of health when she had an abnormal screening mammogram (UIQ right breast)  12/13/24- diagnostic mammogram and US - UOQ @1:00 right breast there is a hypoechoic antiparallel mass w/irregular margins that measures up to 6mm in greatest dimension. cT1bN0  12/31/24: RIGHT breast biopsy, invasive mammary carcinoma with lobular features, favor Gr2, spanning 11mm in greatest dimension. ER/AL positive (%), Her2 negative, Ki67 is 20%  Mammaprint: Luminal A, Low Risk  1/13/25: Bilateral breast MRI: small irregular mass UIQ of the right breast, up to 1cm in greatest dimension. No suspicious IM chain or axillary LNs.  2/25/25: RIGHT breast lumpectomy and SLNBx. Final path: invasive carcinoma with mixed ductal and lobular features, Gr2, 1.8cm in size. Margins widely negative (>5mm). 10 SLN removed, all negative. No LVSI. pT1cN0(sn)    She opted for adjuvant RT, 5 fractions to the whole breast  Concurrent systemic therapy: NONE: hormone therapy to follow RT    Interval History   Ms. Lizama is a 69 y.o. female seen today for her weekly on-treatment evaluation    4/10/25: Fx 4 today. Doing well, denies any side effects thus far. No fatigue, skin changes

## 2025-04-11 ENCOUNTER — CLINICAL DOCUMENTATION (OUTPATIENT)
Facility: HOSPITAL | Age: 70
End: 2025-04-11

## 2025-04-11 ENCOUNTER — HOSPITAL ENCOUNTER (OUTPATIENT)
Facility: HOSPITAL | Age: 70
Discharge: HOME | End: 2025-04-14
Attending: EMERGENCY MEDICINE

## 2025-04-11 LAB
RAD ONC ARIA COURSE FIRST TREATMENT DATE: NORMAL
RAD ONC ARIA COURSE ID: NORMAL
RAD ONC ARIA COURSE INTENT: NORMAL
RAD ONC ARIA COURSE LAST TREATMENT DATE: NORMAL
RAD ONC ARIA COURSE SESSION NUMBER: 5
RAD ONC ARIA COURSE START DATE: NORMAL
RAD ONC ARIA COURSE TREATMENT ELAPSED DAYS: 4
RAD ONC ARIA PLAN FRACTIONS TREATED TO DATE: 5
RAD ONC ARIA PLAN ID: NORMAL
RAD ONC ARIA PLAN PRESCRIBED DOSE PER FRACTION: 5.2 GY
RAD ONC ARIA PLAN PRIMARY REFERENCE POINT: NORMAL
RAD ONC ARIA PLAN TOTAL FRACTIONS PRESCRIBED: 5
RAD ONC ARIA PLAN TOTAL PRESCRIBED DOSE: 2600 CGY
RAD ONC ARIA REFERENCE POINT DOSAGE GIVEN TO DATE: 26 GY
RAD ONC ARIA REFERENCE POINT DOSAGE GIVEN TO DATE: 26.65 GY
RAD ONC ARIA REFERENCE POINT ID: NORMAL
RAD ONC ARIA REFERENCE POINT ID: NORMAL
RAD ONC ARIA REFERENCE POINT SESSION DOSAGE GIVEN: 5.2 GY
RAD ONC ARIA REFERENCE POINT SESSION DOSAGE GIVEN: 5.33 GY

## 2025-04-11 NOTE — PROGRESS NOTES
2600 4/7/25 4/11/25 5       Radiation Treatments       Active   Reference Points   RtBreast   Most recent treatment: Dose given: 520 cGy (on 4/11/2025)   Total: Dose given: 2,600 cGy   Elapsed Days: 4      cp   Most recent treatment: Dose given: 533 cGy (on 4/11/2025)   Total: Dose given: 2,665 cGy   Elapsed Days: 4           Historical   Plans   [id number]   Most recent treatment: Dose planned: 520 cGy (fraction 5 on 4/11/2025)   Total: Dose planned: 2,600 cGy (5 fractions)   Elapsed Days: 4                     Under-treatment Course Summary   She completed radiation without any unexpected side effects to treatment. There were no significant treatment delays or missed treatments during their radiotherapy course.    The patient experienced the following acute toxicities during their radiotherapy course:  Grade 1 fatigue      Follow Up Plan   - RTC in 6 weeks with myself  - Continue FU with the remainder of your oncologic team        Thank you for the opportunity to participate in Ms. Lizama's care.    Faith Goss, DO  [facility]  [facility]  [address] [place] [address]  P: [phone]  [facility] [address], [place] [address]  P: [phone]  [facility]  [address] [place] [address], [address], [place] [address]  P: [phone]

## 2025-04-29 ENCOUNTER — CLINICAL DOCUMENTATION (OUTPATIENT)
Age: 70
End: 2025-04-29

## 2025-04-29 ENCOUNTER — OFFICE VISIT (OUTPATIENT)
Age: 70
End: 2025-04-29
Payer: MEDICARE

## 2025-04-29 ENCOUNTER — RESEARCH ENCOUNTER (OUTPATIENT)
Facility: HOSPITAL | Age: 70
End: 2025-04-29

## 2025-04-29 VITALS
RESPIRATION RATE: 18 BRPM | WEIGHT: 135 LBS | OXYGEN SATURATION: 95 % | TEMPERATURE: 98 F | BODY MASS INDEX: 27.27 KG/M2 | DIASTOLIC BLOOD PRESSURE: 66 MMHG | SYSTOLIC BLOOD PRESSURE: 130 MMHG | HEART RATE: 95 BPM

## 2025-04-29 DIAGNOSIS — C50.911 INVASIVE DUCTAL CARCINOMA OF BREAST, FEMALE, RIGHT (HCC): Primary | ICD-10-CM

## 2025-04-29 PROCEDURE — 99214 OFFICE O/P EST MOD 30 MIN: CPT | Performed by: INTERNAL MEDICINE

## 2025-04-29 PROCEDURE — 1090F PRES/ABSN URINE INCON ASSESS: CPT | Performed by: INTERNAL MEDICINE

## 2025-04-29 PROCEDURE — 3017F COLORECTAL CA SCREEN DOC REV: CPT | Performed by: INTERNAL MEDICINE

## 2025-04-29 PROCEDURE — 1160F RVW MEDS BY RX/DR IN RCRD: CPT | Performed by: INTERNAL MEDICINE

## 2025-04-29 PROCEDURE — 1159F MED LIST DOCD IN RCRD: CPT | Performed by: INTERNAL MEDICINE

## 2025-04-29 PROCEDURE — 1036F TOBACCO NON-USER: CPT | Performed by: INTERNAL MEDICINE

## 2025-04-29 PROCEDURE — 1126F AMNT PAIN NOTED NONE PRSNT: CPT | Performed by: INTERNAL MEDICINE

## 2025-04-29 PROCEDURE — G8419 CALC BMI OUT NRM PARAM NOF/U: HCPCS | Performed by: INTERNAL MEDICINE

## 2025-04-29 PROCEDURE — G8399 PT W/DXA RESULTS DOCUMENT: HCPCS | Performed by: INTERNAL MEDICINE

## 2025-04-29 PROCEDURE — G8427 DOCREV CUR MEDS BY ELIG CLIN: HCPCS | Performed by: INTERNAL MEDICINE

## 2025-04-29 PROCEDURE — 1123F ACP DISCUSS/DSCN MKR DOCD: CPT | Performed by: INTERNAL MEDICINE

## 2025-04-29 NOTE — PROGRESS NOTES
Pt has been screened for all eligibility/ineligibility criteria and has been determined eligible for this protocol. Informed consent was reviewed by RN with patient prior to signing.  Patient was informed that participation is voluntary and she has the right to withdraw at anytime without prejudice.  The patient has been deemed of adequate mental and emotional capacity to give an informed consent per Dr. Sullivan. Possible side effects were discussed in detail, with patient being advised to inform RN or Dr. Sullivan immediately in the event of any side effects once drug is started or at any time should she have any questions.  All questions were answered adequately by RN or Dr. Sullivan.   Patient signed consent today for study [id number]: \"A Randomized, Phase III Clinical Trial of Low?Dose Tamoxifen for Selected Patients with Molecular Low-risk Early?Stage Breast Cancer\".  The patient was accompanied by her .  A copy of ICF given to patient.  No additional questions at this time.      The patient also signed consent for the DCP-001 trial today.

## 2025-04-29 NOTE — PROGRESS NOTES
Darya Lizama is a 69 y.o. female    Chief Complaint   Patient presents with    Follow-up     breast cancer     1. Have you been to the ER, urgent care clinic since your last visit?  Hospitalized since your last visit?No    2. Have you seen or consulted any other health care providers outside of the [facility] since your last visit?  Include any pap smears or colon screening. No

## 2025-04-29 NOTE — PROGRESS NOTES
Cancer [place] at [facility]  [address] [facility], [address] [place] [address]  W: [phone]  F: [phone]    Reason for Visit:   Darya Lizama is a 69 y.o. female who is seen for fu of breast cancer.    Treatment History:   12/31/24: RIGHT breast biopsy: IMC with lobular features, favor histologic grade II (of III), spanning 11 mm in greatest dimension. ER+ (%)/OH+(%)/HER2-, Ki-67 20%.     2/25/25 lumpectomy 1.8 cm grade 2 LN negative/ margins negative    Finished XRT 4/11/25.      STAGE: pT Category:  pT1c   pN Category:  pN0   Breast Biomarker Testing Performed on Previous Biopsy:         Estrogen Receptor (ER) Status:  Positive (greater than 10% of cells   demonstrate nuclear positivity)   Progesterone Receptor (PgR) Status:  Positive   HER2 (by immunohistochemistry):  Negative (Score 1+)   Ki-67 Percentage of Positive Nuclei:  20%   Testing Performed on Case Number:  [id number]     MP LOW RISK    History of Present Illness:     Pt seen today for office fu breast cancer.   Finished XRT 4/11/25. Did well.   Here to discuss adjuvant hormonal therapy   Considering trial.   Here with .   Feels well today.   No fevers/ chills/ chest pain/ SOB/ nausea/ vomiting/diarrhea/ pain/fatigue        Last visit:   consult for new RIGHT breast cancer post lumpectomy 2/25/25.   MP low risk. Initially had abnormal screening mammo 10/24.   Fu mammo 12/24. Had breast biopsy 12/24 and IMC.   Breast MRI 1 cm mass.   MP low risk  Had lumpectomy 2/25/25.   Did well with surgery  Has seen Rad/onc  Here to discuss adjuvant systemic therapy  Feels fine overall today. No medical problems  No fevers/ chills/ chest pain/ SOB/ nausea/ vomiting/diarrhea/ pain/fatigue    Family History:   Paternal Grandmother, breast, unknown age diagnosis    Genetic testing negative.       Past Medical History:   Diagnosis Date    Breast cancer (HCC) 2025    RIGHT BREAST    Dysphagia 7/31/2019    Eval 2017 ENT

## 2025-05-01 DIAGNOSIS — C50.911 INVASIVE DUCTAL CARCINOMA OF BREAST, FEMALE, RIGHT (HCC): Primary | ICD-10-CM

## 2025-05-01 RX ORDER — TAMOXIFEN CITRATE 20 MG/1
20 TABLET ORAL DAILY
Qty: 90 TABLET | Refills: 3 | Status: SHIPPED | OUTPATIENT
Start: 2025-05-01

## 2025-05-02 ENCOUNTER — RESEARCH ENCOUNTER (OUTPATIENT)
Facility: HOSPITAL | Age: 70
End: 2025-05-02

## 2025-05-02 NOTE — PROGRESS NOTES
I met the patient this morning for collection of research study tubes.  She confirmed that she has her prescription for Tamoxifen, but that she had not started taking it prior to the blood draw.  She plans to start dosing later today.

## 2025-05-20 ENCOUNTER — OFFICE VISIT (OUTPATIENT)
Age: 70
End: 2025-05-20
Payer: MEDICARE

## 2025-05-20 VITALS
RESPIRATION RATE: 18 BRPM | HEIGHT: 59 IN | WEIGHT: 136.2 LBS | TEMPERATURE: 97.7 F | OXYGEN SATURATION: 98 % | BODY MASS INDEX: 27.46 KG/M2 | DIASTOLIC BLOOD PRESSURE: 60 MMHG | SYSTOLIC BLOOD PRESSURE: 130 MMHG | HEART RATE: 99 BPM

## 2025-05-20 DIAGNOSIS — N83.201 CYST OF RIGHT OVARY: ICD-10-CM

## 2025-05-20 DIAGNOSIS — R10.11 RUQ PAIN: Primary | ICD-10-CM

## 2025-05-20 DIAGNOSIS — E11.9 TYPE 2 DIABETES MELLITUS WITHOUT COMPLICATION, WITHOUT LONG-TERM CURRENT USE OF INSULIN (HCC): ICD-10-CM

## 2025-05-20 DIAGNOSIS — K76.0 HEPATIC STEATOSIS: ICD-10-CM

## 2025-05-20 LAB
-: NORMAL
ALBUMIN SERPL-MCNC: 3.6 G/DL (ref 3.5–5)
ALBUMIN/GLOB SERPL: 1.1 (ref 1.1–2.2)
ALP SERPL-CCNC: 71 U/L (ref 45–117)
ALT SERPL-CCNC: 25 U/L (ref 12–78)
ANION GAP SERPL CALC-SCNC: 6 MMOL/L (ref 2–12)
AST SERPL-CCNC: 16 U/L (ref 15–37)
BILIRUB SERPL-MCNC: 0.4 MG/DL (ref 0.2–1)
BUN SERPL-MCNC: 16 MG/DL (ref 6–20)
BUN/CREAT SERPL: 15 (ref 12–20)
CALCIUM SERPL-MCNC: 9.2 MG/DL (ref 8.5–10.1)
CHLORIDE SERPL-SCNC: 104 MMOL/L (ref 97–108)
CO2 SERPL-SCNC: 27 MMOL/L (ref 21–32)
CREAT SERPL-MCNC: 1.08 MG/DL (ref 0.55–1.02)
EST. AVERAGE GLUCOSE BLD GHB EST-MCNC: 148 MG/DL
FERRITIN SERPL-MCNC: 290 NG/ML (ref 8–252)
GLOBULIN SER CALC-MCNC: 3.2 G/DL (ref 2–4)
GLUCOSE SERPL-MCNC: 253 MG/DL (ref 65–100)
HAV IGM SER QL: NONREACTIVE
HBA1C MFR BLD: 6.8 % (ref 4–5.6)
HBV CORE IGM SER QL: NONREACTIVE
HBV SURFACE AG SER QL: <0.1 INDEX
HBV SURFACE AG SER QL: NEGATIVE
HCV AB SER IA-ACNC: 0.13 INDEX
HCV AB SERPL QL IA: NONREACTIVE
IRON SATN MFR SERPL: 24 % (ref 20–50)
IRON SERPL-MCNC: 79 UG/DL (ref 35–150)
POTASSIUM SERPL-SCNC: 4.6 MMOL/L (ref 3.5–5.1)
PROT SERPL-MCNC: 6.8 G/DL (ref 6.4–8.2)
SODIUM SERPL-SCNC: 137 MMOL/L (ref 136–145)
TIBC SERPL-MCNC: 330 UG/DL (ref 250–450)

## 2025-05-20 PROCEDURE — 99214 OFFICE O/P EST MOD 30 MIN: CPT

## 2025-05-20 RX ORDER — TIZANIDINE 2 MG/1
2 TABLET ORAL NIGHTLY PRN
Qty: 5 TABLET | Refills: 0 | Status: SHIPPED | OUTPATIENT
Start: 2025-05-20

## 2025-05-20 SDOH — ECONOMIC STABILITY: FOOD INSECURITY: WITHIN THE PAST 12 MONTHS, THE FOOD YOU BOUGHT JUST DIDN'T LAST AND YOU DIDN'T HAVE MONEY TO GET MORE.: NEVER TRUE

## 2025-05-20 SDOH — ECONOMIC STABILITY: FOOD INSECURITY: WITHIN THE PAST 12 MONTHS, YOU WORRIED THAT YOUR FOOD WOULD RUN OUT BEFORE YOU GOT MONEY TO BUY MORE.: NEVER TRUE

## 2025-05-20 ASSESSMENT — PATIENT HEALTH QUESTIONNAIRE - PHQ9
SUM OF ALL RESPONSES TO PHQ QUESTIONS 1-9: 0
1. LITTLE INTEREST OR PLEASURE IN DOING THINGS: NOT AT ALL
SUM OF ALL RESPONSES TO PHQ QUESTIONS 1-9: 0
2. FEELING DOWN, DEPRESSED OR HOPELESS: NOT AT ALL

## 2025-05-20 NOTE — PROGRESS NOTES
Chief Complaint   Patient presents with    Follow-up     [facility] 5/17/25  DX: Right Flank pain.          \"Have you been to the ER, urgent care clinic since your last visit?  Hospitalized since your last visit?\"    YES - When: approximately 3 days ago.  Where and Why: [facility] Er DX: Right Flank Pain. .    “Have you seen or consulted any other health care providers outside of [facility] since your last visit?”    NO            Click Here for Release of Records Request           5/20/2025     9:52 AM   PHQ-9    Little interest or pleasure in doing things 0   Feeling down, depressed, or hopeless 0   PHQ-2 Score 0   PHQ-9 Total Score 0           Financial Resource Strain: Low Risk  (1/8/2024)    Overall Financial Resource Strain (CARDIA)     Difficulty of Paying Living Expenses: Not hard at all      Food Insecurity: No Food Insecurity (5/20/2025)    Hunger Vital Sign     Worried About Running Out of Food in the Last Year: Never true     Ran Out of Food in the Last Year: Never true          Health Maintenance Due   Topic Date Due    Shingles vaccine (1 of 2) Never done    Respiratory Syncytial Virus (RSV) Pregnant or age 60 yrs+ (1 - Risk 60-74 years 1-dose series) Never done    Pneumococcal 50+ years Vaccine (2 of 2 - PCV) 01/11/2025    A1C test (Diabetic or Prediabetic)  01/17/2025    Annual Wellness Visit (Medicare)  04/27/2025    COVID-19 Vaccine (8 - 2024-25 season) 05/14/2025       
(RSV) Pregnant or age 60 yrs+ (1 - Risk 60-74 years 1-dose series) Never done    Pneumococcal 50+ years Vaccine (2 of 2 - PCV) 2025    A1C test (Diabetic or Prediabetic)  2025    Annual Wellness Visit (Medicare)  2025    COVID-19 Vaccine ( season) 2025          Past Medical Hx  I personally reviewed.  Past Medical History:   Diagnosis Date    Breast cancer (HCC)     RIGHT BREAST    Dysphagia 2019    Eval 2017 ENT Dr. Perlman, GI Dr. Sauceda EGD Esophageal Dilatation     History of eczema 2010    Impaired fasting glucose 2019-    Menopause     LMP-2002     (normal spontaneous vaginal delivery) 2010    Prediabetes 9/15/2019    Screening exam for skin cancer 2019    2018 Derm Dr. Coronel    Seasonal allergic rhinitis 2010    Uterine prolapse 2010        SocHx   I personally reviewed.  Social History     Socioeconomic History    Marital status:      Spouse name: Not on file    Number of children: 2    Years of education: Not on file    Highest education level: Not on file   Occupational History    Occupation: Retired Dental Assistant   Tobacco Use    Smoking status: Never     Passive exposure: Never    Smokeless tobacco: Never   Vaping Use    Vaping status: Never Used   Substance and Sexual Activity    Alcohol use: No    Drug use: No    Sexual activity: Not Currently     Partners: Male     Birth control/protection: Surgical, Post-menopausal     Comment: Hysterectomy   Other Topics Concern    Not on file   Social History Narrative        2 children      Retired Dental Assistant      Lives in [facility]/       Son lives in [place] with 6 yo grandson and 6 yo granddaughter      Daughter lives in [place] [facility], 5 yo granddaughter            Diet: nothing special, just follows sensible, balanced diet, lots of fruits/veggies, grilling/baking, mostly chicken/fish/seafood, occ red meat; also since spring of

## 2025-05-21 ENCOUNTER — RESULTS FOLLOW-UP (OUTPATIENT)
Age: 70
End: 2025-05-21

## 2025-05-21 DIAGNOSIS — K76.0 HEPATIC STEATOSIS: Primary | ICD-10-CM

## 2025-05-21 DIAGNOSIS — E11.9 TYPE 2 DIABETES MELLITUS WITHOUT COMPLICATION, WITHOUT LONG-TERM CURRENT USE OF INSULIN (HCC): Primary | ICD-10-CM

## 2025-05-21 RX ORDER — METFORMIN HYDROCHLORIDE 500 MG/1
500 TABLET, EXTENDED RELEASE ORAL
Qty: 90 TABLET | Refills: 1 | Status: SHIPPED | OUTPATIENT
Start: 2025-05-21

## 2025-05-22 ENCOUNTER — HOSPITAL ENCOUNTER (OUTPATIENT)
Facility: HOSPITAL | Age: 70
Discharge: HOME | End: 2025-05-25

## 2025-05-22 ENCOUNTER — TELEPHONE (OUTPATIENT)
Age: 70
End: 2025-05-22

## 2025-05-22 VITALS
HEART RATE: 88 BPM | HEIGHT: 59 IN | DIASTOLIC BLOOD PRESSURE: 70 MMHG | WEIGHT: 135 LBS | BODY MASS INDEX: 27.21 KG/M2 | SYSTOLIC BLOOD PRESSURE: 127 MMHG

## 2025-05-22 DIAGNOSIS — C50.411 MALIGNANT NEOPLASM OF UPPER-OUTER QUADRANT OF RIGHT BREAST IN FEMALE, ESTROGEN RECEPTOR POSITIVE (HCC): Primary | ICD-10-CM

## 2025-05-22 DIAGNOSIS — Z17.0 MALIGNANT NEOPLASM OF UPPER-OUTER QUADRANT OF RIGHT BREAST IN FEMALE, ESTROGEN RECEPTOR POSITIVE (HCC): Primary | ICD-10-CM

## 2025-05-22 ASSESSMENT — PAIN SCALES - GENERAL: PAINLEVEL_OUTOF10: 0

## 2025-05-22 NOTE — PROGRESS NOTES
[facility] [place] [facility]  [facility]    Radiation Oncology Follow Up Note    Date of Encounter: 05/22/25    Darya Lizama  [id number]  1955     Care Team:  Dr. Edmond Sullivan    Diagnosis   C50.411; Z17.0     The patient is a 69 y.o. 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 S/p adjuvant whole breast RT to the right breast, EOT 4/11/25. Now on Tamoxifen 20 mg/day on the [id number] trial.    AJCC Staging has been reviewed  History of Present Illness   Ms. Lizama is a 69 y.o. female seen in routine post treatment follow up for her above diagnosis.      Oncologic History:  The patient has a past medical history of preDM, eczema  10/2024- She was in her usual state of health when she had an abnormal screening mammogram (UIQ right breast)  12/13/24- diagnostic mammogram and US - UOQ @1:00 right breast there is a hypoechoic antiparallel mass w/irregular margins that measures up to 6mm in greatest dimension. cT1bN0  12/31/24: RIGHT breast biopsy, invasive mammary carcinoma with lobular features, favor Gr2, spanning 11mm in greatest dimension. ER/CO positive (%), Her2 negative, Ki67 is 20%  Mammaprint: Luminal A, Low Risk  1/13/25: Bilateral breast MRI: small irregular mass UIQ of the right breast, up to 1cm in greatest dimension. No suspicious IM chain or axillary LNs.  2/25/25: RIGHT breast lumpectomy and SLNBx. Final path: invasive carcinoma with mixed ductal and lobular features, Gr2, 1.8cm in size. Margins widely negative (>5mm). 10 SLN removed, all negative. No LVSI. pT1cN0(sn)  4/7/25 - 4/11/25: Adjuvant whole RIGHT breast radiation, 26 Gy in 5 Fx, tolerated well

## 2025-05-23 LAB — TRANSFERRIN SERPL-MCNC: 250 MG/DL (ref 192–364)

## 2025-05-27 ENCOUNTER — OFFICE VISIT (OUTPATIENT)
Age: 70
End: 2025-05-27
Payer: MEDICARE

## 2025-05-27 VITALS
WEIGHT: 133.8 LBS | BODY MASS INDEX: 26.97 KG/M2 | DIASTOLIC BLOOD PRESSURE: 62 MMHG | HEIGHT: 59 IN | OXYGEN SATURATION: 99 % | RESPIRATION RATE: 18 BRPM | TEMPERATURE: 97.1 F | HEART RATE: 82 BPM | SYSTOLIC BLOOD PRESSURE: 102 MMHG

## 2025-05-27 DIAGNOSIS — E11.9 TYPE 2 DIABETES MELLITUS WITHOUT COMPLICATION, WITHOUT LONG-TERM CURRENT USE OF INSULIN (HCC): ICD-10-CM

## 2025-05-27 DIAGNOSIS — R10.11 RUQ PAIN: Primary | ICD-10-CM

## 2025-05-27 PROCEDURE — 1090F PRES/ABSN URINE INCON ASSESS: CPT

## 2025-05-27 PROCEDURE — 1159F MED LIST DOCD IN RCRD: CPT

## 2025-05-27 PROCEDURE — G8419 CALC BMI OUT NRM PARAM NOF/U: HCPCS

## 2025-05-27 PROCEDURE — 3044F HG A1C LEVEL LT 7.0%: CPT

## 2025-05-27 PROCEDURE — 3017F COLORECTAL CA SCREEN DOC REV: CPT

## 2025-05-27 PROCEDURE — G8427 DOCREV CUR MEDS BY ELIG CLIN: HCPCS

## 2025-05-27 PROCEDURE — 99214 OFFICE O/P EST MOD 30 MIN: CPT

## 2025-05-27 PROCEDURE — 2022F DILAT RTA XM EVC RTNOPTHY: CPT

## 2025-05-27 PROCEDURE — G8399 PT W/DXA RESULTS DOCUMENT: HCPCS

## 2025-05-27 PROCEDURE — 1126F AMNT PAIN NOTED NONE PRSNT: CPT

## 2025-05-27 PROCEDURE — 1036F TOBACCO NON-USER: CPT

## 2025-05-27 PROCEDURE — 1123F ACP DISCUSS/DSCN MKR DOCD: CPT

## 2025-05-27 ASSESSMENT — PATIENT HEALTH QUESTIONNAIRE - PHQ9
SUM OF ALL RESPONSES TO PHQ QUESTIONS 1-9: 0
SUM OF ALL RESPONSES TO PHQ QUESTIONS 1-9: 0
1. LITTLE INTEREST OR PLEASURE IN DOING THINGS: NOT AT ALL
2. FEELING DOWN, DEPRESSED OR HOPELESS: NOT AT ALL
SUM OF ALL RESPONSES TO PHQ QUESTIONS 1-9: 0
SUM OF ALL RESPONSES TO PHQ QUESTIONS 1-9: 0

## 2025-05-27 ASSESSMENT — ENCOUNTER SYMPTOMS
NAUSEA: 0
SHORTNESS OF BREATH: 0

## 2025-05-27 NOTE — PROGRESS NOTES
Chief Complaint   Patient presents with    Follow-up     Right upper quadrant pain.          \"Have you been to the ER, urgent care clinic since your last visit?  Hospitalized since your last visit?\"    NO    “Have you seen or consulted any other health care providers outside of [facility] since your last visit?”    NO            Click Here for Release of Records Request           5/27/2025     7:54 AM   PHQ-9    Little interest or pleasure in doing things 0   Feeling down, depressed, or hopeless 0   PHQ-2 Score 0   PHQ-9 Total Score 0           Financial Resource Strain: Low Risk  (1/8/2024)    Overall Financial Resource Strain (CARDIA)     Difficulty of Paying Living Expenses: Not hard at all      Food Insecurity: No Food Insecurity (5/20/2025)    Hunger Vital Sign     Worried About Running Out of Food in the Last Year: Never true     Ran Out of Food in the Last Year: Never true          Health Maintenance Due   Topic Date Due    Diabetic foot exam  Never done    Diabetic Alb to Cr ratio (uACR) test  Never done    Diabetic retinal exam  Never done    Respiratory Syncytial Virus (RSV) Pregnant or age 60 yrs+ (1 - Risk 60-74 years 1-dose series) Never done    Annual Wellness Visit (Medicare Advantage)  01/01/2025    Pneumococcal 50+ years Vaccine (2 of 2 - PCV) 01/11/2025    Lipids  01/17/2025    COVID-19 Vaccine (8 - 2024-25 season) 05/14/2025

## 2025-05-27 NOTE — PROGRESS NOTES
[facility]  [address] [place] [address]  Office [phone], Fax [phone]    Subjective:     Chief Complaint   Patient presents with    Follow-up     Right upper quadrant pain.        HPI:    History of Present Illness  The patient is a 69-year-old female presenting for follow-up of right upper quadrant pain and diabetes.    Right Upper Quadrant Pain  - Last seen on 05/20/2025 for intermittent right upper quadrant spasms, which resolved by the time of the visit  - Pain was 10/10 at its worst, with no trauma  - ER visit on 05/18/2025 included imaging: CTA chest, transvaginal and abdominal ultrasound, pelvic ultrasound, revealing a 5.4 x 0.5 cm right ovarian cyst  - CT abdomen/pelvis showed hepatomegaly, hiatal hernia, diverticulosis (no diverticulitis)  - Right upper quadrant ultrasound revealed hepatic steatosis  - Plan included rechecking liver functions and tizanidine for muscle spasms  - Referral to GI was made  - Positive response to muscle relaxants, no recurrence of spasms, last dose on Saturday  - GI appointment scheduled for 07/16/2025  - Has not had any pain since last visit    Diabetes Follow-up  - A1c increased from 6.6 in 2022 to 6.8 on 05/20/2025  - Started metformin, no adverse effects  - Diagnosed with diabetes, advised to monitor diet  - No previous diabetic foot exam  - Diet low in carbohydrates, avoids sugary beverages    Supplemental information: None    SOCIAL HISTORY  No history of alcohol abuse.    FAMILY HISTORY  Younger brother has type 2 diabetes, managed with diet and exercise for 26 years.        Health Maintenance:  Health Maintenance Due   Topic Date Due    Diabetic Alb to Cr ratio (uACR) test  Never done    Diabetic retinal exam  Never done    Respiratory Syncytial Virus (RSV) Pregnant or age 60 yrs+ (1 - Risk 60-74 years 1-dose series) Never done    Annual Wellness Visit (Medicare Advantage)  01/01/2025    Pneumococcal 50+ years

## 2025-05-29 ENCOUNTER — TELEPHONE (OUTPATIENT)
Age: 70
End: 2025-05-29

## 2025-05-29 NOTE — TELEPHONE ENCOUNTER
----- Message from Ana Cristina WICK sent at 2025  5:08 PM EDT -----  Regarding: Lab Notification: Unable to obtain samples  We have been notified that samples could not be obtained for this patient's most recent lab work. Please place new orders prior to the patient's return.    If addition assistance is required, Please contact Client Services at [phone].    Patient: Darya Lizama  : 1955  Ordering Provider: Blake Sears MD    ## Urine ##    Thank you,    [facility] Client Services

## 2025-07-14 ENCOUNTER — PATIENT MESSAGE (OUTPATIENT)
Age: 70
End: 2025-07-14

## 2025-07-14 DIAGNOSIS — E11.9 TYPE 2 DIABETES MELLITUS WITHOUT COMPLICATION, WITHOUT LONG-TERM CURRENT USE OF INSULIN (HCC): ICD-10-CM

## 2025-07-14 RX ORDER — METFORMIN HYDROCHLORIDE 500 MG/1
1000 TABLET, EXTENDED RELEASE ORAL
Qty: 60 TABLET | Refills: 0 | Status: SHIPPED | OUTPATIENT
Start: 2025-07-14

## 2025-07-14 RX ORDER — METFORMIN HYDROCHLORIDE 500 MG/1
500 TABLET, EXTENDED RELEASE ORAL
Qty: 30 TABLET | Status: CANCELLED | OUTPATIENT
Start: 2025-07-14

## 2025-07-14 NOTE — TELEPHONE ENCOUNTER
MD BENJAMIN,    **Patient only has 3 left- going out of town Wednesday.    UPDATE: patient mychart message states that her dose was increased to 2 tablets daily (1000mg/day).    Needs new Rx w/updated dose.  (Going out of town on 16th).  (Has "[employer]"hart message as well)    MD Lozano NOTE from 5/27/25: Continue metformin for another week before increasing to 1000 mg daily.       Patient call stating she was requesting metformin refill and has not heard back?      Thanks, Tejal    Last appointment: 5/27/25 Blake  Next appointment: 8/7/25 MD BENJAMIN  Previous refill encounter(s): 5/21/25 90 + 1 (for once/day dose)    Requested Prescriptions     Pending Prescriptions Disp Refills    metFORMIN (GLUCOPHAGE-XR) 500 MG extended release tablet 60 tablet 0     Sig: Take 2 tablets by mouth daily (with breakfast)     For Pharmacy Admin Tracking Only    Program: Medication Refill  CPA in place:    Recommendation Provided To:   Intervention Detail: New Rx: 1, reason: Patient Preference  Intervention Accepted By:   Gap Closed?:    Time Spent (min): 5

## 2025-07-29 ENCOUNTER — OFFICE VISIT (OUTPATIENT)
Age: 70
End: 2025-07-29
Payer: MEDICARE

## 2025-07-29 VITALS
BODY MASS INDEX: 25.25 KG/M2 | DIASTOLIC BLOOD PRESSURE: 74 MMHG | HEART RATE: 84 BPM | TEMPERATURE: 98 F | SYSTOLIC BLOOD PRESSURE: 121 MMHG | RESPIRATION RATE: 18 BRPM | OXYGEN SATURATION: 97 % | WEIGHT: 125 LBS

## 2025-07-29 DIAGNOSIS — Z80.9 FAMILY HISTORY OF CANCER: ICD-10-CM

## 2025-07-29 DIAGNOSIS — C50.911 INVASIVE DUCTAL CARCINOMA OF BREAST, FEMALE, RIGHT (HCC): Primary | ICD-10-CM

## 2025-07-29 DIAGNOSIS — Z98.890 HISTORY OF LUMPECTOMY OF RIGHT BREAST: ICD-10-CM

## 2025-07-29 DIAGNOSIS — Z79.810 USE OF TAMOXIFEN (NOLVADEX): ICD-10-CM

## 2025-07-29 DIAGNOSIS — M85.80 OSTEOPENIA, UNSPECIFIED LOCATION: ICD-10-CM

## 2025-07-29 PROCEDURE — 99203 OFFICE O/P NEW LOW 30 MIN: CPT | Performed by: NURSE PRACTITIONER

## 2025-07-29 NOTE — PROGRESS NOTES
Darya Lizama is a 69 y.o. female    Chief Complaint   Patient presents with    Follow-up     Right Breast Cancer       1. Have you been to the ER, urgent care clinic since your last visit?  Hospitalized since your last visit? Yes, [facility] ER 5/17/25     2. Have you seen or consulted any other health care providers outside of the [facility] since your last visit?  Include any pap smears or colon screening. No    
and constipation. She denies pain today. She is here alone today.     Past Medical History:   Diagnosis Date    Breast cancer (HCC)     RIGHT BREAST    Dysphagia 2019    Eval 2017 ENT Dr. Perlman, GI Dr. Sauceda EGD Esophageal Dilatation     History of eczema 2010    History of oral contraceptive use     1.5 years    Impaired fasting glucose 2019-    Menopause     LMP-     (normal spontaneous vaginal delivery) 2010    Prediabetes 9/15/2019    Screening exam for skin cancer 2019    2018 Derm Dr. Coronel    Screening for osteoporosis     Dexa scan    Seasonal allergic rhinitis 2010    Uterine prolapse 2010      Past Surgical History:   Procedure Laterality Date    APPENDECTOMY  1988    BREAST LUMPECTOMY Right 2025    RIGHT BREAST LUMPECTOMY, RIGHT BREAST SENTINEL NODE BIOPSY (SENT NODE  94) performed by Kareem Pruitt Jr, MD at [facility] AMBULATORY OR    COLONOSCOPY  2017    Repeat 10 yrs, Dr. Sauceda    COLONOSCOPY      Ok x 10yrs    HYSTERECTOMY (CERVIX STATUS UNKNOWN)      DARION STEREO BREAST BX W LOC DEVICE 1ST LESION LEFT Right 2024    [facility] 3D IGLESIA STEREO VAC BX BREAST RT 1ST LES W/CLIP SPEC 2024 [facility] RAD MAMMO    PARTIAL HYSTERECTOMY (CERVIX NOT REMOVED)      Uterine Prolapse & Fibroids    PA APPENDECTOMY      TUBAL LIGATION      UPPER GASTROINTESTINAL ENDOSCOPY  2017    Chronic gastritis and H Pylori positive-Dr Sauceda    WISDOM TOOTH EXTRACTION        Social History     Tobacco Use    Smoking status: Never     Passive exposure: Never    Smokeless tobacco: Never   Substance Use Topics    Alcohol use: No      Family History   Problem Relation Age of Onset    High Cholesterol Mother     Osteoarthritis Mother     Osteoporosis Mother     Stroke Mother         TIA    Dementia Mother 83    Atrial Fibrillation Father 81    Deep Vein Thrombosis Sister         post-op DVT    No Known Problems Sister     No Known

## 2025-08-04 SDOH — HEALTH STABILITY: PHYSICAL HEALTH: ON AVERAGE, HOW MANY MINUTES DO YOU ENGAGE IN EXERCISE AT THIS LEVEL?: 90 MIN

## 2025-08-04 SDOH — HEALTH STABILITY: PHYSICAL HEALTH: ON AVERAGE, HOW MANY DAYS PER WEEK DO YOU ENGAGE IN MODERATE TO STRENUOUS EXERCISE (LIKE A BRISK WALK)?: 7 DAYS

## 2025-08-04 ASSESSMENT — LIFESTYLE VARIABLES
HOW MANY STANDARD DRINKS CONTAINING ALCOHOL DO YOU HAVE ON A TYPICAL DAY: PATIENT DOES NOT DRINK
HOW OFTEN DO YOU HAVE SIX OR MORE DRINKS ON ONE OCCASION: 1
HOW MANY STANDARD DRINKS CONTAINING ALCOHOL DO YOU HAVE ON A TYPICAL DAY: 0
HOW OFTEN DO YOU HAVE A DRINK CONTAINING ALCOHOL: 1
HOW OFTEN DO YOU HAVE A DRINK CONTAINING ALCOHOL: NEVER

## 2025-08-07 ENCOUNTER — OFFICE VISIT (OUTPATIENT)
Age: 70
End: 2025-08-07
Payer: MEDICARE

## 2025-08-07 VITALS
WEIGHT: 124.6 LBS | BODY MASS INDEX: 26.16 KG/M2 | HEART RATE: 90 BPM | SYSTOLIC BLOOD PRESSURE: 120 MMHG | DIASTOLIC BLOOD PRESSURE: 59 MMHG | TEMPERATURE: 98.6 F | OXYGEN SATURATION: 98 % | HEIGHT: 58 IN | RESPIRATION RATE: 18 BRPM

## 2025-08-07 DIAGNOSIS — C50.911 INVASIVE DUCTAL CARCINOMA OF BREAST, FEMALE, RIGHT (HCC): ICD-10-CM

## 2025-08-07 DIAGNOSIS — Z00.00 MEDICARE ANNUAL WELLNESS VISIT, SUBSEQUENT: Primary | ICD-10-CM

## 2025-08-07 DIAGNOSIS — N39.41 URGE URINARY INCONTINENCE: ICD-10-CM

## 2025-08-07 DIAGNOSIS — Z23 NEED FOR PNEUMOCOCCAL 20-VALENT CONJUGATE VACCINATION: ICD-10-CM

## 2025-08-07 DIAGNOSIS — Z23 ENCOUNTER FOR IMMUNIZATION: ICD-10-CM

## 2025-08-07 DIAGNOSIS — E11.9 TYPE 2 DIABETES MELLITUS WITHOUT COMPLICATION, WITHOUT LONG-TERM CURRENT USE OF INSULIN (HCC): ICD-10-CM

## 2025-08-07 PROCEDURE — 99214 OFFICE O/P EST MOD 30 MIN: CPT | Performed by: FAMILY MEDICINE

## 2025-08-07 PROCEDURE — 90677 PCV20 VACCINE IM: CPT | Performed by: FAMILY MEDICINE

## 2025-08-07 ASSESSMENT — ENCOUNTER SYMPTOMS
CONSTIPATION: 0
NAUSEA: 0
RESPIRATORY NEGATIVE: 1
VOMITING: 0
GASTROINTESTINAL NEGATIVE: 1
DIARRHEA: 0
ALLERGIC/IMMUNOLOGIC NEGATIVE: 1
EYES NEGATIVE: 1
ABDOMINAL PAIN: 0

## 2025-08-08 DIAGNOSIS — E11.9 TYPE 2 DIABETES MELLITUS WITHOUT COMPLICATION, WITHOUT LONG-TERM CURRENT USE OF INSULIN (HCC): ICD-10-CM

## 2025-08-08 DIAGNOSIS — N39.41 URGE URINARY INCONTINENCE: ICD-10-CM

## 2025-08-11 DIAGNOSIS — E11.9 TYPE 2 DIABETES MELLITUS WITHOUT COMPLICATION, WITHOUT LONG-TERM CURRENT USE OF INSULIN (HCC): Primary | ICD-10-CM

## 2025-08-11 DIAGNOSIS — N39.41 URGE URINARY INCONTINENCE: ICD-10-CM

## 2025-08-12 DIAGNOSIS — E11.9 TYPE 2 DIABETES MELLITUS WITHOUT COMPLICATION, WITHOUT LONG-TERM CURRENT USE OF INSULIN (HCC): ICD-10-CM

## 2025-08-12 DIAGNOSIS — N39.41 URGE URINARY INCONTINENCE: ICD-10-CM

## 2025-08-12 LAB
APPEARANCE UR: CLEAR
BACTERIA URNS QL MICRO: NEGATIVE /HPF
BILIRUB UR QL: NEGATIVE
COLOR UR: NORMAL
CREAT UR-MCNC: 56.4 MG/DL (ref 28–217)
EPITH CASTS URNS QL MICRO: NORMAL /LPF
GLUCOSE UR STRIP.AUTO-MCNC: NEGATIVE MG/DL
HGB UR QL STRIP: NEGATIVE
KETONES UR QL STRIP.AUTO: NEGATIVE MG/DL
LEUKOCYTE ESTERASE UR QL STRIP.AUTO: NEGATIVE
MICROALBUMIN UR-MCNC: <1.2 MG/DL
MICROALBUMIN/CREAT UR-RTO: NORMAL MG/G
NITRITE UR QL STRIP.AUTO: NEGATIVE
PH UR STRIP: 5.5 (ref 5–8)
PROT UR STRIP-MCNC: NEGATIVE MG/DL
RBC #/AREA URNS HPF: NORMAL /HPF (ref 0–5)
SP GR UR REFRACTOMETRY: 1.01 (ref 1–1.03)
URINE CULTURE IF INDICATED: NORMAL
UROBILINOGEN UR QL STRIP.AUTO: 0.2 EU/DL (ref 0.2–1)
WBC URNS QL MICRO: NORMAL /HPF (ref 0–4)

## 2025-08-13 ENCOUNTER — TELEPHONE (OUTPATIENT)
Age: 70
End: 2025-08-13

## 2025-08-13 DIAGNOSIS — E11.9 TYPE 2 DIABETES MELLITUS WITHOUT COMPLICATION, WITHOUT LONG-TERM CURRENT USE OF INSULIN (HCC): ICD-10-CM

## 2025-08-13 LAB
ALBUMIN SERPL-MCNC: 3.7 G/DL (ref 3.5–5.2)
ALBUMIN/GLOB SERPL: 1.4 (ref 1.1–2.2)
ALP SERPL-CCNC: 44 U/L (ref 35–104)
ALT SERPL-CCNC: 16 U/L (ref 10–35)
ANION GAP SERPL CALC-SCNC: 11 MMOL/L (ref 2–14)
AST SERPL-CCNC: 20 U/L (ref 10–35)
BILIRUB SERPL-MCNC: 0.3 MG/DL (ref 0–1.2)
BUN SERPL-MCNC: 14 MG/DL (ref 8–23)
BUN/CREAT SERPL: 16 (ref 12–20)
CALCIUM SERPL-MCNC: 9.4 MG/DL (ref 8.8–10.2)
CHLORIDE SERPL-SCNC: 106 MMOL/L (ref 98–107)
CHOLEST SERPL-MCNC: 131 MG/DL (ref 0–200)
CO2 SERPL-SCNC: 25 MMOL/L (ref 20–29)
CREAT SERPL-MCNC: 0.87 MG/DL (ref 0.6–1)
EST. AVERAGE GLUCOSE BLD GHB EST-MCNC: 127 MG/DL
GLOBULIN SER CALC-MCNC: 2.6 G/DL (ref 2–4)
GLUCOSE SERPL-MCNC: 98 MG/DL (ref 65–100)
HBA1C MFR BLD: 6 % (ref 4–5.6)
HDLC SERPL-MCNC: 46 MG/DL (ref 40–60)
HDLC SERPL: 2.9 (ref 0–5)
LDLC SERPL CALC-MCNC: 69 MG/DL (ref 0–100)
POTASSIUM SERPL-SCNC: 4.6 MMOL/L (ref 3.5–5.1)
PROT SERPL-MCNC: 6.4 G/DL (ref 6.4–8.3)
SODIUM SERPL-SCNC: 143 MMOL/L (ref 136–145)
TRIGL SERPL-MCNC: 82 MG/DL (ref 0–150)
VLDLC SERPL CALC-MCNC: 16 MG/DL

## 2025-08-16 ENCOUNTER — RESULTS FOLLOW-UP (OUTPATIENT)
Age: 70
End: 2025-08-16

## 2025-08-20 DIAGNOSIS — E11.9 TYPE 2 DIABETES MELLITUS WITHOUT COMPLICATION, WITHOUT LONG-TERM CURRENT USE OF INSULIN (HCC): Primary | ICD-10-CM

## 2025-08-20 RX ORDER — ACYCLOVIR 400 MG/1
TABLET ORAL
Qty: 1 EACH | Refills: 5 | Status: SHIPPED | OUTPATIENT
Start: 2025-08-20

## (undated) DEVICE — SEALER LAP SM L18.8CM OPN JAW HAND/FOOT SWCH FORCETRIAD

## (undated) DEVICE — SOLUTION IRRIG 1000ML 09% SOD CHL USP PIC PLAS CONTAINER

## (undated) DEVICE — BLADE ES ELASTOMERIC COAT INSUL DURABLE BEND UPTO 90DEG

## (undated) DEVICE — GARMENT,MEDLINE,DVT,INT,CALF,MED, GEN2: Brand: MEDLINE

## (undated) DEVICE — COVER US PRB W5XL96IN LTX W/ GEL

## (undated) DEVICE — SPONGE,LAP,18"X18",XR,ST,5/TRAY: Brand: MEDLINE

## (undated) DEVICE — LIQUIBAND RAPID ADHESIVE 36/CS 0.8ML: Brand: MEDLINE

## (undated) DEVICE — HYPODERMIC SAFETY NEEDLE: Brand: MAGELLAN

## (undated) DEVICE — SUTURE PERMA-HAND SZ 2-0 L30IN NONABSORBABLE BLK L26MM SH K833H

## (undated) DEVICE — SUTURE MONOCRYL + SZ 4-0 L27IN ABSRB UD L19MM PS-2 3/8 CIR MCP426H

## (undated) DEVICE — SYRINGE MED 10ML LUERLOCK TIP W/O SFTY DISP

## (undated) DEVICE — GLOVE ORANGE PI 7 1/2   MSG9075

## (undated) DEVICE — PLASTICS CHEST BREAST ASU: Brand: MEDLINE INDUSTRIES, INC.

## (undated) DEVICE — PENCIL SMK EVAC ALL IN 1 DSGN ENH VISIBILITY IMPROVED AIR

## (undated) DEVICE — SUTURE VICRYL + SZ 3-0 L27IN ABSRB UD L26MM SH 1/2 CIR VCP416H